# Patient Record
Sex: FEMALE | Race: WHITE | NOT HISPANIC OR LATINO | Employment: OTHER | URBAN - METROPOLITAN AREA
[De-identification: names, ages, dates, MRNs, and addresses within clinical notes are randomized per-mention and may not be internally consistent; named-entity substitution may affect disease eponyms.]

---

## 2017-06-09 ENCOUNTER — HOSPITAL ENCOUNTER (OUTPATIENT)
Dept: RADIOLOGY | Facility: HOSPITAL | Age: 72
Discharge: HOME | End: 2017-06-09
Attending: ORTHOPAEDIC SURGERY
Payer: MEDICARE

## 2017-06-09 ENCOUNTER — OFFICE VISIT (OUTPATIENT)
Dept: ORTHOPEDICS | Facility: CLINIC | Age: 72
End: 2017-06-09
Payer: MEDICARE

## 2017-06-09 VITALS — WEIGHT: 285 LBS | HEIGHT: 68 IN | BODY MASS INDEX: 43.19 KG/M2

## 2017-06-09 DIAGNOSIS — M79.671 RIGHT FOOT PAIN: ICD-10-CM

## 2017-06-09 DIAGNOSIS — M79.671 RIGHT FOOT PAIN: Primary | ICD-10-CM

## 2017-06-09 DIAGNOSIS — M21.621 BUNIONETTE OF RIGHT FOOT: ICD-10-CM

## 2017-06-09 PROCEDURE — 1159F MED LIST DOCD IN RCRD: CPT | Mod: S$GLB,,, | Performed by: ORTHOPAEDIC SURGERY

## 2017-06-09 PROCEDURE — 99203 OFFICE O/P NEW LOW 30 MIN: CPT | Mod: S$GLB,,, | Performed by: ORTHOPAEDIC SURGERY

## 2017-06-09 PROCEDURE — 1125F AMNT PAIN NOTED PAIN PRSNT: CPT | Mod: S$GLB,,, | Performed by: ORTHOPAEDIC SURGERY

## 2017-06-09 PROCEDURE — 73630 X-RAY EXAM OF FOOT: CPT | Mod: 26,RT,, | Performed by: RADIOLOGY

## 2017-06-09 PROCEDURE — 99999 PR PBB SHADOW E&M-EST. PATIENT-LVL II: CPT | Mod: PBBFAC,,, | Performed by: ORTHOPAEDIC SURGERY

## 2017-06-09 PROCEDURE — 73630 X-RAY EXAM OF FOOT: CPT | Mod: TC,RT

## 2017-06-09 RX ORDER — AMLODIPINE BESYLATE 10 MG/1
10 TABLET ORAL DAILY
COMMUNITY
End: 2023-06-08 | Stop reason: SDUPTHER

## 2017-06-09 RX ORDER — METOPROLOL SUCCINATE 50 MG/1
TABLET, EXTENDED RELEASE ORAL
Refills: 3 | COMMUNITY
Start: 2017-05-23

## 2017-06-09 RX ORDER — HYDROCHLOROTHIAZIDE 25 MG/1
TABLET ORAL
Refills: 3 | COMMUNITY
Start: 2017-04-09 | End: 2023-06-08

## 2017-06-09 RX ORDER — DOXAZOSIN 8 MG/1
TABLET ORAL
Refills: 1 | COMMUNITY
Start: 2017-05-23

## 2017-06-09 RX ORDER — FENOFIBRATE 145 MG/1
145 TABLET, FILM COATED ORAL DAILY
COMMUNITY

## 2017-06-09 RX ORDER — SOLIFENACIN SUCCINATE 10 MG/1
TABLET, FILM COATED ORAL
Refills: 3 | COMMUNITY
Start: 2017-06-01

## 2018-01-06 ENCOUNTER — OFFICE VISIT (OUTPATIENT)
Dept: URGENT CARE | Facility: CLINIC | Age: 73
End: 2018-01-06
Payer: MEDICARE

## 2018-01-06 VITALS
HEIGHT: 68 IN | SYSTOLIC BLOOD PRESSURE: 141 MMHG | WEIGHT: 254 LBS | TEMPERATURE: 99 F | RESPIRATION RATE: 18 BRPM | DIASTOLIC BLOOD PRESSURE: 69 MMHG | BODY MASS INDEX: 38.49 KG/M2 | HEART RATE: 78 BPM | OXYGEN SATURATION: 98 %

## 2018-01-06 DIAGNOSIS — M25.512 ACUTE PAIN OF LEFT SHOULDER: ICD-10-CM

## 2018-01-06 DIAGNOSIS — J06.9 UPPER RESPIRATORY INFECTION WITH COUGH AND CONGESTION: Primary | ICD-10-CM

## 2018-01-06 LAB
CTP QC/QA: YES
FLUAV AG NPH QL: NEGATIVE
FLUBV AG NPH QL: NEGATIVE

## 2018-01-06 PROCEDURE — 87804 INFLUENZA ASSAY W/OPTIC: CPT | Mod: 59,QW,S$GLB, | Performed by: NURSE PRACTITIONER

## 2018-01-06 PROCEDURE — 99203 OFFICE O/P NEW LOW 30 MIN: CPT | Mod: 25,S$GLB,, | Performed by: NURSE PRACTITIONER

## 2018-01-06 PROCEDURE — 96372 THER/PROPH/DIAG INJ SC/IM: CPT | Mod: S$GLB,,, | Performed by: EMERGENCY MEDICINE

## 2018-01-06 RX ORDER — BETAMETHASONE SODIUM PHOSPHATE AND BETAMETHASONE ACETATE 3; 3 MG/ML; MG/ML
6 INJECTION, SUSPENSION INTRA-ARTICULAR; INTRALESIONAL; INTRAMUSCULAR; SOFT TISSUE
Status: COMPLETED | OUTPATIENT
Start: 2018-01-06 | End: 2018-01-06

## 2018-01-06 RX ORDER — FLUTICASONE PROPIONATE 50 MCG
2 SPRAY, SUSPENSION (ML) NASAL DAILY
Qty: 1 BOTTLE | Refills: 0 | Status: SHIPPED | OUTPATIENT
Start: 2018-01-06 | End: 2018-06-27

## 2018-01-06 RX ORDER — AZITHROMYCIN 250 MG/1
TABLET, FILM COATED ORAL
Qty: 6 TABLET | Refills: 0 | Status: SHIPPED | OUTPATIENT
Start: 2018-01-06 | End: 2018-06-27

## 2018-01-06 RX ORDER — BENZONATATE 100 MG/1
200 CAPSULE ORAL 3 TIMES DAILY PRN
Qty: 30 CAPSULE | Refills: 0 | Status: SHIPPED | OUTPATIENT
Start: 2018-01-06 | End: 2018-06-27

## 2018-01-06 RX ORDER — CODEINE PHOSPHATE AND GUAIFENESIN 10; 100 MG/5ML; MG/5ML
5 SOLUTION ORAL NIGHTLY PRN
Qty: 90 ML | Refills: 0 | Status: SHIPPED | OUTPATIENT
Start: 2018-01-06 | End: 2018-01-16

## 2018-01-06 RX ADMIN — BETAMETHASONE SODIUM PHOSPHATE AND BETAMETHASONE ACETATE 6 MG: 3; 3 INJECTION, SUSPENSION INTRA-ARTICULAR; INTRALESIONAL; INTRAMUSCULAR; SOFT TISSUE at 10:01

## 2018-01-06 NOTE — PATIENT INSTRUCTIONS
Follow up with your doctor in a few days as needed.  Return to the urgent care or go to the ER if symptoms get worse.    Take Coricidin HBP for decongestant instead of OTC cough/cold medicine due to your high blood pressure.         Please drink plenty of fluids.  Please get plenty of rest.  Please return here or go to the Emergency Department for any concerns or worsening of condition.  If you were prescribed antibiotics, please take them to completion.  If you were given wait & see antibiotics, please wait 5-7 days before taking them, and only take them if your symptoms have worsened or not improved.  If you do begin taking the antibiotics, please take them to completion.  If you were prescribed a narcotic medication, do not drive or operate heavy equipment or machinery while taking these medications.  If you were given a steroid shot in the clinic and have also been given a prescription for a steroid such as Prednisone or a Medrol Dose Pack, please begin taking them tomorrow.  If you do not have Hypertension or any history of palpitations, it is ok to take over the counter Sudafed or Mucinex D or Allegra-D or Claritin-D or Zyrtec-D.  If you do take one of the above, it is ok to combine that with plain over the counter Mucinex or Allegra or Claritin or Zyrtec.  If for example you are taking Zyrtec -D, you can combine that with Mucinex, but not Mucinex-D.  If you are taking Mucinex-D, you can combine that with plain Allegra or Claritin or Zyrtec.   If you do have Hypertension or palpitations, it is safe to take Coricidin HBP for relief of sinus symptoms.  If not allergic, please take over the counter Tylenol (Acetaminophen) and/or Motrin (Ibuprofen) as directed for control of pain and/or fever.  Please follow up with your primary care doctor or specialist as needed.    If you  smoke, please stop smoking.        Viral Upper Respiratory Illness (Adult)  You have a viral upper respiratory illness (URI), which is  another term for the common cold. This illness is contagious during the first few days. It is spread through the air by coughing and sneezing. It may also be spread by direct contact (touching the sick person and then touching your own eyes, nose, or mouth). Frequent handwashing will decrease risk of spread. Most viral illnesses go away within 7 to 10 days with rest and simple home remedies. Sometimes the illness may last for several weeks. Antibiotics will not kill a virus, and they are generally not prescribed for this condition.    Home care  · If symptoms are severe, rest at home for the first 2 to 3 days. When you resume activity, don't let yourself get too tired.  · Avoid being exposed to cigarette smoke (yours or others).  · You may use acetaminophen or ibuprofen to control pain and fever, unless another medicine was prescribed. (Note: If you have chronic liver or kidney disease, have ever had a stomach ulcer or gastrointestinal bleeding, or are taking blood-thinning medicines, talk with your healthcare provider before using these medicines.) Aspirin should never be given to anyone under 18 years of age who is ill with a viral infection or fever. It may cause severe liver or brain damage.  · Your appetite may be poor, so a light diet is fine. Avoid dehydration by drinking 6 to 8 glasses of fluids per day (water, soft drinks, juices, tea, or soup). Extra fluids will help loosen secretions in the nose and lungs.  · Over-the-counter cold medicines will not shorten the length of time youre sick, but they may be helpful for the following symptoms: cough, sore throat, and nasal and sinus congestion. (Note: Do not use decongestants if you have high blood pressure.)  Follow-up care  Follow up with your healthcare provider, or as advised.  When to seek medical advice  Call your healthcare provider right away if any of these occur:  · Cough with lots of colored sputum (mucus)  · Severe headache; face, neck, or ear  pain  · Difficulty swallowing due to throat pain  · Fever of 100.4°F (38°C)  Call 911, or get immediate medical care  Call emergency services right away if any of these occur:  · Chest pain, shortness of breath, wheezing, or difficulty breathing  · Coughing up blood  · Inability to swallow due to throat pain  Date Last Reviewed: 9/13/2015 © 2000-2017 [employer]. [address] [place] [address], [place] [address]. All rights reserved. This information is not intended as a substitute for professional medical care. Always follow your healthcare professional's instructions.        Viral Upper Respiratory Illness (Adult)  You have a viral upper respiratory illness (URI), which is another term for the common cold. This illness is contagious during the first few days. It is spread through the air by coughing and sneezing. It may also be spread by direct contact (touching the sick person and then touching your own eyes, nose, or mouth). Frequent handwashing will decrease risk of spread. Most viral illnesses go away within 7 to 10 days with rest and simple home remedies. Sometimes the illness may last for several weeks. Antibiotics will not kill a virus, and they are generally not prescribed for this condition.    Home care  · If symptoms are severe, rest at home for the first 2 to 3 days. When you resume activity, don't let yourself get too tired.  · Avoid being exposed to cigarette smoke (yours or others).  · You may use acetaminophen or ibuprofen to control pain and fever, unless another medicine was prescribed. (Note: If you have chronic liver or kidney disease, have ever had a stomach ulcer or gastrointestinal bleeding, or are taking blood-thinning medicines, talk with your healthcare provider before using these medicines.) Aspirin should never be given to anyone under 18 years of age who is ill with a viral infection or fever. It may cause severe liver or brain damage.  · Your appetite may be poor, so a light  diet is fine. Avoid dehydration by drinking 6 to 8 glasses of fluids per day (water, soft drinks, juices, tea, or soup). Extra fluids will help loosen secretions in the nose and lungs.  · Over-the-counter cold medicines will not shorten the length of time youre sick, but they may be helpful for the following symptoms: cough, sore throat, and nasal and sinus congestion. (Note: Do not use decongestants if you have high blood pressure.)  Follow-up care  Follow up with your healthcare provider, or as advised.  When to seek medical advice  Call your healthcare provider right away if any of these occur:  · Cough with lots of colored sputum (mucus)  · Severe headache; face, neck, or ear pain  · Difficulty swallowing due to throat pain  · Fever of 100.4°F (38°C)  Call 911, or get immediate medical care  Call emergency services right away if any of these occur:  · Chest pain, shortness of breath, wheezing, or difficulty breathing  · Coughing up blood  · Inability to swallow due to throat pain  Date Last Reviewed: 9/13/2015 © 2000-2017 [employer]. [address] [place] [id number]. All rights reserved. This information is not intended as a substitute for professional medical care. Always follow your healthcare professional's instructions.

## 2018-06-27 ENCOUNTER — HOSPITAL ENCOUNTER (EMERGENCY)
Facility: HOSPITAL | Age: 73
Discharge: HOME | End: 2018-06-27
Attending: EMERGENCY MEDICINE
Payer: MEDICARE

## 2018-06-27 VITALS
TEMPERATURE: 99 F | HEIGHT: 68 IN | WEIGHT: 260 LBS | HEART RATE: 58 BPM | SYSTOLIC BLOOD PRESSURE: 148 MMHG | RESPIRATION RATE: 18 BRPM | OXYGEN SATURATION: 96 % | DIASTOLIC BLOOD PRESSURE: 68 MMHG | BODY MASS INDEX: 39.4 KG/M2

## 2018-06-27 DIAGNOSIS — M25.572 LEFT LATERAL ANKLE PAIN: ICD-10-CM

## 2018-06-27 DIAGNOSIS — S32.001A CLOSED BURST FRACTURE OF LUMBAR VERTEBRA, INITIAL ENCOUNTER: Primary | ICD-10-CM

## 2018-06-27 DIAGNOSIS — R07.9 CHEST PAIN: ICD-10-CM

## 2018-06-27 DIAGNOSIS — R07.81 RIB PAIN ON RIGHT SIDE: ICD-10-CM

## 2018-06-27 DIAGNOSIS — M25.559 HIP PAIN: ICD-10-CM

## 2018-06-27 DIAGNOSIS — M79.672 LEFT FOOT PAIN: ICD-10-CM

## 2018-06-27 PROCEDURE — 63600175 PHARM REV CODE 636 W HCPCS: Performed by: EMERGENCY MEDICINE

## 2018-06-27 PROCEDURE — 99284 EMERGENCY DEPT VISIT MOD MDM: CPT | Mod: 25

## 2018-06-27 PROCEDURE — 96372 THER/PROPH/DIAG INJ SC/IM: CPT

## 2018-06-27 PROCEDURE — 25000003 PHARM REV CODE 250: Performed by: EMERGENCY MEDICINE

## 2018-06-27 RX ORDER — KETOROLAC TROMETHAMINE 30 MG/ML
15 INJECTION, SOLUTION INTRAMUSCULAR; INTRAVENOUS
Status: COMPLETED | OUTPATIENT
Start: 2018-06-27 | End: 2018-06-27

## 2018-06-27 RX ORDER — OXYCODONE AND ACETAMINOPHEN 5; 325 MG/1; MG/1
1 TABLET ORAL
Status: COMPLETED | OUTPATIENT
Start: 2018-06-27 | End: 2018-06-27

## 2018-06-27 RX ORDER — ALLOPURINOL 100 MG/1
100 TABLET ORAL DAILY
COMMUNITY
End: 2023-06-08

## 2018-06-27 RX ORDER — METHOCARBAMOL 100 MG/ML
1000 INJECTION, SOLUTION INTRAMUSCULAR; INTRAVENOUS ONCE
Status: COMPLETED | OUTPATIENT
Start: 2018-06-27 | End: 2018-06-27

## 2018-06-27 RX ORDER — HYDROCODONE BITARTRATE AND ACETAMINOPHEN 5; 325 MG/1; MG/1
1 TABLET ORAL 2 TIMES DAILY PRN
COMMUNITY
End: 2018-09-07

## 2018-06-27 RX ADMIN — METHOCARBAMOL 600 MG: 100 INJECTION, SOLUTION INTRAMUSCULAR; INTRAVENOUS at 06:06

## 2018-06-27 RX ADMIN — OXYCODONE HYDROCHLORIDE AND ACETAMINOPHEN 1 TABLET: 5; 325 TABLET ORAL at 05:06

## 2018-06-27 RX ADMIN — KETOROLAC TROMETHAMINE 15 MG: 30 INJECTION, SOLUTION INTRAMUSCULAR at 05:06

## 2018-06-27 NOTE — ED PROVIDER NOTES
Encounter Date: 2018    SCRIBE #1 NOTE: I, Alisia Boyle, am scribing for, and in the presence of,  Dr. Bravo. I have scribed the entire note.       History     Chief Complaint   Patient presents with    Fall     pt tripped and hit back on a truck and then fell and landed on her bottom, denies LOC, denies blood thinners, endorses back, rib, buttock and L toe pain from fall.      This is a 72 y.o. female who has a past medical history of Disorder of kidney and ureter (); High cholesterol; Hypertension; and Osteoarthritis.     The patient presents to the Emergency Department with back pain s/p fall today.   Pt states she was standing with her hand on a board when her son moved the board and she stepped back, tripping over a cinderblock. She fell to the ground in a sitting position, hitting her head and shoulders on the truck.  Symptoms are associated with right flank pain, left foot bruising, and ankle pain.  Pt denies syncope, headache, neck pain, tingling, numbness, weakness, or nausea/vomiting.   Symptoms are aggravated by movement and certain positions.  She reports no alleviating factors. Pt took Vicadin 4 hours ago.  Patient states she needs bilateral knee replacements and uses a walker normally for ambulation. She notes that she forgot to take her bladder Rx today. She reports a history of degenerative disc disease.    Pt has a past surgical history that includes  section; Cholecystectomy (1974); Appendectomy (1975); Tubal ligation (1978); Incisional hernia repair (1996); Elbow surgery (Right, 2001); and Endometrial ablation (2002).        The history is provided by the patient.     Review of patient's allergies indicates:   Allergen Reactions    Ditropan [oxybutynin chloride]      DIZZINESS    Lisinopril Rash    Lotensin [benazepril] Rash     Past Medical History:   Diagnosis Date    Disorder of kidney and ureter 2016    High cholesterol     Hypertension      Osteoarthritis      Past Surgical History:   Procedure Laterality Date    APPENDECTOMY  1975     SECTION      x 3-, ,     CHOLECYSTECTOMY  1974    ELBOW SURGERY Right 2001    removal of lipoma    ENDOMETRIAL ABLATION  2002    INCISIONAL HERNIA REPAIR  1996    TUBAL LIGATION  1978     No family history on file.  Social History   Substance Use Topics    Smoking status: Never Smoker    Smokeless tobacco: Never Used    Alcohol use Yes      Comment: maybe once a yr if that     Review of Systems   Constitutional: Negative for chills and fever.   HENT: Negative for congestion, rhinorrhea and sore throat.    Eyes: Negative for redness and visual disturbance.   Respiratory: Negative for cough, shortness of breath and wheezing.    Cardiovascular: Negative for chest pain and palpitations.   Gastrointestinal: Negative for abdominal pain, diarrhea, nausea and vomiting.   Genitourinary: Positive for flank pain (right side). Negative for dysuria and hematuria.   Musculoskeletal: Positive for arthralgias, back pain and myalgias. Negative for neck pain.   Skin: Positive for color change (Left foot bruising.). Negative for rash.   Neurological: Negative for dizziness, weakness, light-headedness, numbness and headaches.        Negative for tingling.   Psychiatric/Behavioral: Negative for confusion.   All other systems reviewed and are negative.      Physical Exam     Initial Vitals [18 1521]   BP Pulse Resp Temp SpO2   (!) 146/68 83 18 98.1 °F (36.7 °C) 95 %      MAP       --         Physical Exam    Nursing note and vitals reviewed.  Constitutional: She appears well-developed and well-nourished. She is not diaphoretic. No distress.   HENT:   Head: Normocephalic and atraumatic.   Mouth/Throat: Oropharynx is clear and moist.   Eyes: Conjunctivae and EOM are normal. Pupils are equal, round, and reactive to light. No scleral icterus.   Neck: Normal range of motion. Neck supple.    Cardiovascular: Normal rate, regular rhythm and normal heart sounds. Exam reveals no gallop and no friction rub.    No murmur heard.      Pulmonary/Chest: Breath sounds normal. She has no wheezes. She has no rhonchi. She has no rales.   Tenderness to right lateral chest wall.   Abdominal: Soft. Bowel sounds are normal. There is no tenderness.   Musculoskeletal:        Left ankle: She exhibits swelling. She exhibits normal range of motion and no ecchymosis. Tenderness. Lateral malleolus tenderness found.        Cervical back: She exhibits no tenderness and no bony tenderness.        Thoracic back: She exhibits tenderness and bony tenderness.        Lumbar back: She exhibits tenderness and bony tenderness.        Back:    Tenderness to the mid/low T-spine and L-spine.   No bruising/swelling.    Neurological: She is alert and oriented to person, place, and time.   Skin: Skin is warm and dry. No rash noted. No erythema.   Psychiatric: She has a normal mood and affect. Her behavior is normal.         ED Course   Procedures  Labs Reviewed - No data to display       Imaging Results          CT Lumbar Spine Without Contrast (Final result)  Result time 06/27/18 18:22:34    Final result by Randy Singh MD (06/27/18 18:22:34)                 Impression:      No evidence of fracture of the T9 vertebral body as suggested on the preceding x-ray series.  Schmorl's node involving the superior endplate of T8.    Acute compression fracture of the L1 vertebral body with involvement of the posterior cortex.  No evidence of retropulsion into the spinal canal.  The findings are consistent of a burst type fracture.  Approximately 30% loss of the vertebral body height.    Advanced multilevel degenerative changes of the lumbar spine.    Advanced atherosclerotic disease of the abdominal aorta and its branch vessels.    Retroaortic left renal vein.      Electronically signed by: Randy Singh MD  Date:    06/27/2018  Time:    18:22              Narrative:    EXAMINATION:  CT THORACIC SPINE WITHOUT CONTRAST; CT LUMBAR SPINE WITHOUT CONTRAST    CLINICAL HISTORY:  back pain after fall, T9 fracture;; back pain after fall, L1 fracture;    TECHNIQUE:  Axial CT scan of the thoracolumbar spine was performed without intravenous contrast.  Coronal and sagittal reformats were obtained.    COMPARISON:  X-rays of the spine dated 06/27/2018.    FINDINGS:  CT scan of the thoracic spine:    There is a prominence of the normal thoracic kyphosis.  The remainder of the thoracic alignment is within normal limits.  There is a Schmorl's node involving the superior endplate of T8.  The remainder of the vertebral body heights are maintained.  No compression fracture of the T9 vertebral body is identified.  There is overall diffuse osteopenia.  There is no evidence of mass effect in the spinal canal.  There is hypertrophy of the posterior elements.  There is multilevel disc desiccation.  There is variable spinal canal and neural foraminal narrowing.  There is no evidence of acute fracture or listhesis of the thoracic spine.    The visualized lung fields are unremarkable.  There is no evidence of a pneumothorax.  No pulmonary contusion is identified.  There are extensive calcifications involving the thoracic and abdominal aorta.  There is a retroaortic left renal vein.  There is no evidence of lymphadenopathy.    CT lumbar spine:    There is straightening of the normal lumbar lordosis.  There is an acute compression fracture of the L1 vertebral body with approximately 30% loss of the vertebral body height.  There is involvement of the posterior cortex.  There is no evidence of retropulsion into the spinal canal.  The remainder of the vertebral body heights are maintained.  There is marked hypertrophy of the posterior elements.  The transverse processes are within normal limits.  There is advanced disc desiccation at multiple levels.  There is variable spinal canal and neural  foraminal narrowing.  There is severe neural foraminal narrowing at the L4-L5 level.    There are extensive calcifications within the abdominal aorta and its branch vessels.  There is a retroaortic left renal vein.  There is no evidence of a hematoma in the pelvis.                               CT Thoracic Spine Without Contrast (Final result)  Result time 06/27/18 18:22:34    Final result by Randy Singh MD (06/27/18 18:22:34)                 Impression:      No evidence of fracture of the T9 vertebral body as suggested on the preceding x-ray series.  Schmorl's node involving the superior endplate of T8.    Acute compression fracture of the L1 vertebral body with involvement of the posterior cortex.  No evidence of retropulsion into the spinal canal.  The findings are consistent of a burst type fracture.  Approximately 30% loss of the vertebral body height.    Advanced multilevel degenerative changes of the lumbar spine.    Advanced atherosclerotic disease of the abdominal aorta and its branch vessels.    Retroaortic left renal vein.      Electronically signed by: Randy Singh MD  Date:    06/27/2018  Time:    18:22             Narrative:    EXAMINATION:  CT THORACIC SPINE WITHOUT CONTRAST; CT LUMBAR SPINE WITHOUT CONTRAST    CLINICAL HISTORY:  back pain after fall, T9 fracture;; back pain after fall, L1 fracture;    TECHNIQUE:  Axial CT scan of the thoracolumbar spine was performed without intravenous contrast.  Coronal and sagittal reformats were obtained.    COMPARISON:  X-rays of the spine dated 06/27/2018.    FINDINGS:  CT scan of the thoracic spine:    There is a prominence of the normal thoracic kyphosis.  The remainder of the thoracic alignment is within normal limits.  There is a Schmorl's node involving the superior endplate of T8.  The remainder of the vertebral body heights are maintained.  No compression fracture of the T9 vertebral body is identified.  There is overall diffuse osteopenia.  There is no  evidence of mass effect in the spinal canal.  There is hypertrophy of the posterior elements.  There is multilevel disc desiccation.  There is variable spinal canal and neural foraminal narrowing.  There is no evidence of acute fracture or listhesis of the thoracic spine.    The visualized lung fields are unremarkable.  There is no evidence of a pneumothorax.  No pulmonary contusion is identified.  There are extensive calcifications involving the thoracic and abdominal aorta.  There is a retroaortic left renal vein.  There is no evidence of lymphadenopathy.    CT lumbar spine:    There is straightening of the normal lumbar lordosis.  There is an acute compression fracture of the L1 vertebral body with approximately 30% loss of the vertebral body height.  There is involvement of the posterior cortex.  There is no evidence of retropulsion into the spinal canal.  The remainder of the vertebral body heights are maintained.  There is marked hypertrophy of the posterior elements.  The transverse processes are within normal limits.  There is advanced disc desiccation at multiple levels.  There is variable spinal canal and neural foraminal narrowing.  There is severe neural foraminal narrowing at the L4-L5 level.    There are extensive calcifications within the abdominal aorta and its branch vessels.  There is a retroaortic left renal vein.  There is no evidence of a hematoma in the pelvis.                               X-Ray Ankle Complete Left (Final result)  Result time 06/27/18 17:05:05    Final result by Eliecer Ventura MD (06/27/18 17:05:05)                 Impression:      As above      Electronically signed by: Eliecer Ventura MD  Date:    06/27/2018  Time:    17:05             Narrative:    EXAMINATION:  XR ANKLE COMPLETE 3 VIEW LEFT    CLINICAL HISTORY:  Pain in left ankle and joints of left foot    TECHNIQUE:  AP, lateral and oblique views of the left ankle were  performed.    COMPARISON:  None    FINDINGS:  Three views.    There is diffuse edema about the ankle.  There is osteopenia.  Ankle mortise is intact.  No convincing acute displaced fracture or dislocation of the ankle.  No radiopaque foreign body.                               X-Ray Foot Complete Left (Final result)  Result time 06/27/18 17:09:37    Final result by Miley Benjamin MD (06/27/18 17:09:37)                 Impression:      No definite acute process seen.      Electronically signed by: Miley Benjamin MD  Date:    06/27/2018  Time:    17:09             Narrative:    EXAMINATION:  XR FOOT COMPLETE 3 VIEW LEFT    CLINICAL HISTORY:  .  Pain in left foot    TECHNIQUE:  AP, lateral and oblique views of the left foot were performed.    COMPARISON:  None    FINDINGS:  Decreased mineralization of the osseous structures.  The alignment is normal. No definite acute fracture identified. No osseous lesions. Small inferior calcaneal spur.  The soft tissues appear normal.                               X-Ray Hips Bilateral 2 View Incl AP Pelvis (Final result)  Result time 06/27/18 17:08:01    Final result by Miley Benjamin MD (06/27/18 17:08:01)                 Impression:      No definite acute fractures seen.    If patient is significantly symptomatic and cannot bear weight a CT can be performed.      Electronically signed by: Miley Benjamin MD  Date:    06/27/2018  Time:    17:08             Narrative:    EXAMINATION:  XR HIPS BILATERAL 2 VIEW INCL AP PELVIS    CLINICAL HISTORY:  Pain in unspecified hip    TECHNIQUE:  AP view of the pelvis and frogleg lateral views of both hips were performed.    COMPARISON:  None.    FINDINGS:  Mild bilateral hip joint space narrowing.  No significant sclerosis or osteophyte formation.  No large geodes.  The bilateral femoral heads demonstrate normal contour.  The bilateral sacroiliac joints appear symmetrical.  The symphysis pubis appear normal.  No fracture  identified, no osseous lesions.  The soft tissues appear normal.                               X-Ray Thoracic Spine AP Lateral (Final result)  Result time 06/27/18 17:07:13    Final result by Randy Singh MD (06/27/18 17:07:13)                 Impression:      Minimal anterior wedge compression deformity of the T9 vertebral body.  This remains overall indeterminate.  Noncontrast MRI of the thoracic spine may be obtained for further evaluation.    Otherwise, unremarkable examination.      Electronically signed by: Randy Singh MD  Date:    06/27/2018  Time:    17:07             Narrative:    EXAMINATION:  XR THORACIC SPINE AP LATERAL    CLINICAL HISTORY:  back pain after fall from standing;    TECHNIQUE:  Frontal and lateral radiographs of the thoracic spine.    COMPARISON:  None    FINDINGS:  The thoracic alignment is within normal limits.  There is diffuse osteopenia.  There is minimal anterior wedge deformity of the T9 vertebral body.  This remains indeterminate.  The remaining vertebral body heights are maintained.  There is hypertrophy of the posterior elements.  There is intervertebral disc space narrowing at multiple levels.  There is variable spinal canal and neural foraminal narrowing.    There are calcifications of the aortic knob.  There are nonspecific calcifications in the left upper abdominal quadrant.  The paraspinal soft tissues are otherwise unremarkable.                               X-Ray Lumbar Spine Ap And Lateral (Final result)  Result time 06/27/18 17:05:23    Final result by Miley Benjamin MD (06/27/18 17:05:23)                 Impression:      Significant spondylosis of the lumbar spine.    Age-indeterminate moderate fracture of L1.      Electronically signed by: Miley Benjamin MD  Date:    06/27/2018  Time:    17:05             Narrative:    EXAMINATION:  XR LUMBAR SPINE AP AND LATERAL    CLINICAL HISTORY:  back pain after fall from standing;    TECHNIQUE:  AP, lateral and spot  images were performed of the lumbar spine.    COMPARISON:  None    FINDINGS:  The alignment of the lumbar spine appears normal.  There is moderate loss of height of the L1 vertebrae suggestive of an age indeterminate fracture.  There is moderate is severe disc space narrowing noted at all levels.  There is no osseous lesions identified.  The sacroiliac joints appear symmetrical on the AP view.  Extensive atherosclerotic changes of the aorta and iliac vessels.                               X-Ray Ribs 2 View Right (Final result)  Result time 06/27/18 17:10:11    Final result by Randy Singh MD (06/27/18 17:10:11)                 Impression:      No acute process.      Electronically signed by: Randy Singh MD  Date:    06/27/2018  Time:    17:10             Narrative:    EXAMINATION:  XR RIBS 2 VIEW RIGHT    CLINICAL HISTORY:  Pleurodynia    TECHNIQUE:  Two views of the right ribs were performed.    COMPARISON:  None    FINDINGS:  There is diffuse demineralization of the osseous structures.  There is no evidence of a displaced right-sided rib fracture.  There is arthropathy involving the visualized portions of the right shoulder.  There is also arthropathy involving the acromioclavicular joint.  No evidence of a pneumothorax is present.  There is no evidence of pulmonary contusion.                               X-Ray Chest PA And Lateral (Final result)  Result time 06/27/18 17:06:51    Final result by Miley Benjamin MD (06/27/18 17:06:51)                 Impression:      No acute intrathoracic abnormality seen.    Mild loss of height of the T8 and probably L1 vertebrae  of unknown chronicity      Electronically signed by: Miley Benjamin MD  Date:    06/27/2018  Time:    17:06             Narrative:    EXAMINATION:  XR CHEST PA AND LATERAL    CLINICAL HISTORY:  Chest pain, unspecified    TECHNIQUE:  PA and lateral views of the chest were performed.    COMPARISON:  None    FINDINGS:  The lungs are clear,  with normal appearance of pulmonary vasculature and no pleural effusion or pneumothorax.    The cardiac silhouette is normal in size. The hilar and mediastinal contours are unremarkable.    Bones are intact. Mild loss of height of a upper lumbar vertebrae.  Mild loss of height of the T8 vertebrae.                                 Medical Decision Making:   Initial Assessment:   71 y/o female presents to the ED s/p fall from standing with back, right flank, and L ankle pain.   Will obtain X-rays and reassess.  Differential Diagnosis:   Differential Diagnosis includes, but is not limited to:  Fracture, dislocation, compartment syndrome, nerve injury/palsy, vascular injury, rhabdomyolysis, hemarthrosis, septic joint, bursitis, muscle strain, ligament tear/sprain, laceration with foreign body, abrasion, soft tissue contusion, osteoarthritis.    Clinical Tests:   Radiological Study: Ordered and Reviewed  ED Management:  X-rays suggestive of T9 and L1 vertebral body fractures.  Will obtain CT T/L spine.    CT revealed no T-spine fx, L1 burst fracture with 30% height loss, no retropulsion.  Discussed with transfer center and Dr. Reina, KATYA, who recommends TLSO brace for comfort and f/u in [facility] as OP.  Patient and family updated on findings and comfortable with plan.   Upon re-evaluation, the patient's status has remained stable.  After complete ED evaluation, clinical impression is most consistent with lumbar compression fx.  At this time, I feel there is no emergent condition requiring further evaluation or admission. I believe the patient is stable for discharge from the ED. The patient and any additional family present were updated with test results, overall clinical impression, and recommended further plan of care. All questions were answered. The patient expressed understanding and agreed with current plan for discharge with Ortho Spine follow-up within 1 week. Strict return precautions were provided,  including weakness, neuro deficits, incontinence, return/worsening of current symptoms or any other concerns.                         Clinical Impression:     1. Closed burst fracture of lumbar vertebra, initial encounter    2. Chest pain    3. Rib pain on right side    4. Hip pain    5. Left foot pain    6. Left lateral ankle pain            Disposition:   Disposition: Discharged  Condition: Stable      I, Dr. Alex Bravo, personally performed the services described in this documentation. All medical record entries made by the scribe were at my direction and in my presence.  I have reviewed the chart and agree that the record reflects my personal performance and is accurate and complete.     Alex Bravo MD.  4:01 PM 06/27/2018                       Alex Bravo MD  06/27/18 [id number]

## 2018-06-27 NOTE — ED TRIAGE NOTES
Fall (pt tripped and hit back on a truck and then fell and landed on her bottom, denies LOC, denies blood thinners, endorses back, rib, buttock and L toe pain from fall. )

## 2018-06-28 NOTE — ED NOTES
Pt with no c/o pain 3/10 to back at this time.  States that she is ready to go.  Dr. Bravo at bedside to discuss discharge instructions.

## 2018-06-28 NOTE — DISCHARGE INSTRUCTIONS
[facility]  Address: [address] [place] [address] [place] [address]   Phone: [phone]    Ask for TLSO brace for comfort.

## 2018-06-28 NOTE — ED NOTES
Discharge instructions and prescriptions given and explained.  Follow up care and back brace purchase discussed with pt and daughter.

## 2018-07-13 ENCOUNTER — OFFICE VISIT (OUTPATIENT)
Dept: ORTHOPEDICS | Facility: CLINIC | Age: 73
End: 2018-07-13
Payer: MEDICARE

## 2018-07-13 VITALS — HEIGHT: 68 IN | BODY MASS INDEX: 39.4 KG/M2 | WEIGHT: 259.94 LBS

## 2018-07-13 DIAGNOSIS — S32.010A CLOSED COMPRESSION FRACTURE OF FIRST LUMBAR VERTEBRA, INITIAL ENCOUNTER: Primary | ICD-10-CM

## 2018-07-13 PROCEDURE — 99214 OFFICE O/P EST MOD 30 MIN: CPT | Mod: S$GLB,,, | Performed by: PHYSICIAN ASSISTANT

## 2018-07-13 PROCEDURE — 99999 PR PBB SHADOW E&M-EST. PATIENT-LVL III: CPT | Mod: PBBFAC,,, | Performed by: PHYSICIAN ASSISTANT

## 2018-07-13 RX ORDER — CALCITONIN SALMON 200 [IU]/.09ML
SPRAY, METERED NASAL
Qty: 22.2 ML | Refills: 0 | Status: SHIPPED | OUTPATIENT
Start: 2018-07-13 | End: 2018-09-28

## 2018-07-13 RX ORDER — TRAMADOL HYDROCHLORIDE 50 MG/1
50 TABLET ORAL EVERY 4 HOURS PRN
Qty: 60 TABLET | Refills: 0 | Status: SHIPPED | OUTPATIENT
Start: 2018-07-13 | End: 2018-12-14 | Stop reason: SDUPTHER

## 2018-07-13 RX ORDER — ACETAMINOPHEN AND CODEINE PHOSPHATE 300; 30 MG/1; MG/1
TABLET ORAL
COMMUNITY
Start: 2016-09-19 | End: 2018-07-13

## 2018-07-13 RX ORDER — CALCITONIN SALMON 200 [IU]/.09ML
1 SPRAY, METERED NASAL DAILY
Qty: 1 BOTTLE | Refills: 0 | Status: SHIPPED | OUTPATIENT
Start: 2018-07-13 | End: 2018-07-13 | Stop reason: SDUPTHER

## 2018-07-13 RX ORDER — CYCLOBENZAPRINE HCL 10 MG
TABLET ORAL
Refills: 3 | COMMUNITY
Start: 2018-07-05

## 2018-07-13 NOTE — PROGRESS NOTES
DATE: 2018  PATIENT: Lexii Gonzalez    Supervising Physician: Wu Sal M.D.    CHIEF COMPLAINT: back pain    HISTORY:  Lexii Gonzalez is a 72 y.o. female here for initial evaluation of low back pain (Back - 8). The pain has been present for 2 weeks.  She slipped and fell landing on her back on .  She presented to the ED after the fall and was diagnosed with an L1 compression fracture.  She says the pain in her back has improved only slightly since she fell. The patient describes the pain as aching and sharp.  The pain is worse with rolling over in bed and getting up from a sitting position and improved by nothing in particular. There is no associated numbness and tingling. There is no subjective weakness. Prior treatments have included hydrocodone, tylenol #3 and flexeril, but no calcitonin, ESIs or surgery.  She uses a Rolator to ambulate.  She was fitted with a TLSO yesterday.     The patient denies myelopathic symptoms such as handwriting changes or difficulty with buttons/coins/keys. Denies perineal paresthesias, bowel/bladder dysfunction.    PAST MEDICAL/SURGICAL HISTORY:  Past Medical History:   Diagnosis Date    Disorder of kidney and ureter 2016    High cholesterol     Hypertension     Osteoarthritis      Past Surgical History:   Procedure Laterality Date    APPENDECTOMY  1975     SECTION      x 3-, ,     CHOLECYSTECTOMY  1974    ELBOW SURGERY Right 2001    removal of lipoma    ENDOMETRIAL ABLATION  2002    INCISIONAL HERNIA REPAIR  1996    TUBAL LIGATION  1978       Current Medications:   Current Outpatient Prescriptions:     allopurinol (ZYLOPRIM) 100 MG tablet, Take 100 mg by mouth once daily., Disp: , Rfl:     amlodipine (NORVASC) 10 MG tablet, Take 10 mg by mouth once daily., Disp: , Rfl:     cyclobenzaprine (FLEXERIL) 10 MG tablet, TK 1 T PO TID PRN, Disp: , Rfl: 3    doxazosin (CARDURA) 8 MG Tab, TK 2 TS PO QD FOR BP, Disp: , Rfl:  1    fenofibrate (TRICOR) 145 MG tablet, Take 145 mg by mouth once daily., Disp: , Rfl:     hydrochlorothiazide (HYDRODIURIL) 25 MG tablet, TK 1 T PO QD UTD FOR BP AND SWELLING, Disp: , Rfl: 3    metoprolol succinate (TOPROL-XL) 50 MG 24 hr tablet, TK 1.5 T PO D, Disp: , Rfl: 3    VESICARE 10 mg tablet, TK 1 T PO D, Disp: , Rfl: 3    calcitonin, salmon, (FORTICAL) 200 unit/actuation nasal spray, 1 spray by Nasal route once daily., Disp: 1 Bottle, Rfl: 0    HYDROcodone-acetaminophen (NORCO) 5-325 mg per tablet, Take 1 tablet by mouth 2 (two) times daily as needed for Pain., Disp: , Rfl:     traMADol (ULTRAM) 50 mg tablet, Take 1 tablet (50 mg total) by mouth every 4 (four) hours as needed for Pain., Disp: 60 tablet, Rfl: 0    Social History:   Social History     Social History    Marital status:      Spouse name: N/A    Number of children: N/A    Years of education: N/A     Occupational History    Not on file.     Social History Main Topics    Smoking status: Never Smoker    Smokeless tobacco: Never Used    Alcohol use Yes      Comment: maybe once a yr if that    Drug use: No    Sexual activity: Not on file     Other Topics Concern    Not on file     Social History Narrative    No narrative on file       REVIEW OF SYSTEMS:  Constitution: Negative. Negative for chills, fever and night sweats.   Cardiovascular: Negative for chest pain and syncope.   Respiratory: Negative for cough and shortness of breath.   Gastrointestinal: See HPI. Negative for nausea/vomiting. Negative for abdominal pain.  Genitourinary: See HPI. Negative for discoloration or dysuria.  Skin: Negative for dry skin, itching and rash.   Hematologic/Lymphatic: Negative for bleeding problem. Does not bruise/bleed easily.   Musculoskeletal: Negative for falls and muscle weakness.   Neurological: See HPI. No seizures.   Endocrine: Negative for polydipsia, polyphagia and polyuria.   Allergic/Immunologic: Negative for hives and  "persistent infections.    PHYSICAL EXAMINATION:    Ht 5' 8" (1.727 m)   Wt 117.9 kg (259 lb 14.8 oz)   BMI 39.52 kg/m²     General: The patient is a very pleasant 72 y.o. female in no apparent distress, the patient is oriented to person, place and time.   Psych: Normal mood and affect  HEENT: Vision grossly intact, hearing intact to the spoken word.  Lungs: Respirations unlabored.  Gait: Normal station and gait, no difficulty with toe or heel walk.   Skin: Dorsal lumbar skin negative for rashes, lesions, hairy patches and surgical scars.  Range of motion: Lumbar range of motion is acceptable. There is mild thoracolumbar tenderness to palpation.  Spinal Balance: Global saggital and coronal spinal balance acceptable, no significant for scoliosis and kyphosis.  Musculoskeletal: No pain with the range of motion of the bilateral hips. No trochanteric tenderness to palpation.  Vascular: Bilateral lower extremities warm and well perfused, Dorsalis pedis pulses 2+ bilaterally.  Neurological: Normal strength and tone in all major motor groups in the bilateral lower extremities. Normal sensation to light touch in the L2-S1 dermatomes bilaterally.  Deep tendon reflexes symmetric 1+ in the bilateral lower extremities.  Negative Babinski bilaterally.  Straight leg raise negative bilaterally.     IMAGING:   Today I personally reviewed plain films and CT thoracic and lumbar spine which show an acute L1 compression fracture.        Body mass index is 39.52 kg/m².    No results found for: HGBA1C      ASSESSMENT/PLAN:    Diagnoses and all orders for this visit:    Closed compression fracture of first lumbar vertebra, initial encounter  -     X-Ray Lumbar Spine Ap And Lateral; Future    Other orders  -     calcitonin, salmon, (FORTICAL) 200 unit/actuation nasal spray; 1 spray by Nasal route once daily.  -     traMADol (ULTRAM) 50 mg tablet; Take 1 tablet (50 mg total) by mouth every 4 (four) hours as needed for Pain.    She will " continue TLSO brace.  Prescription for calcitonin nasal spray sent to the pharmacy.  I have also given her a prescription for tramadol.  I will see her back in 8 weeks with repeat imaging, sooner with any new or worsening symptoms.       Follow-up if symptoms worsen or fail to improve.

## 2018-09-07 ENCOUNTER — HOSPITAL ENCOUNTER (OUTPATIENT)
Dept: RADIOLOGY | Facility: HOSPITAL | Age: 73
Discharge: HOME | End: 2018-09-07
Attending: PHYSICIAN ASSISTANT
Payer: MEDICARE

## 2018-09-07 ENCOUNTER — OFFICE VISIT (OUTPATIENT)
Dept: ORTHOPEDICS | Facility: CLINIC | Age: 73
End: 2018-09-07
Payer: MEDICARE

## 2018-09-07 VITALS — BODY MASS INDEX: 39.4 KG/M2 | HEIGHT: 68 IN | WEIGHT: 259.94 LBS

## 2018-09-07 DIAGNOSIS — S32.010A CLOSED COMPRESSION FRACTURE OF FIRST LUMBAR VERTEBRA, INITIAL ENCOUNTER: ICD-10-CM

## 2018-09-07 DIAGNOSIS — S32.010A CLOSED COMPRESSION FRACTURE OF FIRST LUMBAR VERTEBRA, INITIAL ENCOUNTER: Primary | ICD-10-CM

## 2018-09-07 PROCEDURE — 99999 PR PBB SHADOW E&M-EST. PATIENT-LVL III: CPT | Mod: PBBFAC,,, | Performed by: PHYSICIAN ASSISTANT

## 2018-09-07 PROCEDURE — 72100 X-RAY EXAM L-S SPINE 2/3 VWS: CPT | Mod: 26,,, | Performed by: RADIOLOGY

## 2018-09-07 PROCEDURE — 99213 OFFICE O/P EST LOW 20 MIN: CPT | Mod: PBBFAC,25 | Performed by: PHYSICIAN ASSISTANT

## 2018-09-07 PROCEDURE — 99213 OFFICE O/P EST LOW 20 MIN: CPT | Mod: S$PBB,,, | Performed by: PHYSICIAN ASSISTANT

## 2018-09-07 PROCEDURE — 72100 X-RAY EXAM L-S SPINE 2/3 VWS: CPT | Mod: TC

## 2018-09-07 PROCEDURE — 1101F PT FALLS ASSESS-DOCD LE1/YR: CPT | Mod: CPTII,,, | Performed by: PHYSICIAN ASSISTANT

## 2018-09-07 RX ORDER — DIAZEPAM 2 MG/1
2 TABLET ORAL
Qty: 2 TABLET | Refills: 0 | Status: SHIPPED | OUTPATIENT
Start: 2018-09-07 | End: 2018-09-28

## 2018-09-07 RX ORDER — HYDROCODONE BITARTRATE AND ACETAMINOPHEN 7.5; 325 MG/1; MG/1
1 TABLET ORAL EVERY 12 HOURS PRN
Qty: 42 TABLET | Refills: 0 | Status: SHIPPED | OUTPATIENT
Start: 2018-09-07 | End: 2018-12-14

## 2018-09-07 NOTE — PROGRESS NOTES
DATE: 9/7/2018  PATIENT: Lexii Gonzalez    Attending Physician: Wu Sal M.D.    Date of Injury: 6/27/2018    Diagnosis:   1. Closed compression fracture of first lumbar vertebra, initial encounter        History: Lexii Gonzalez is seen today for follow-up following the above listed injury. Overall the patient is doing well but today notes her pain is improved slightly, but she still has a significant amount of pain.  She rates her pain 7/10 today.  She is wearing a TLSO.  She is ambulating with a Rolator.  She is taking norco for pain but she can't sleep at night because of pain.     Exam: Pleasant, NAD. Neuro exam is stable. No signs of DVT.  There is mild midline tenderness to palpation.    Radiographs: imaging shows progression of L1 compression fracture.     Assessment/Plan:   Discussed with Dr. Sal.  Will get a new MRI.  I will call with results.

## 2018-09-14 ENCOUNTER — HOSPITAL ENCOUNTER (OUTPATIENT)
Dept: RADIOLOGY | Facility: HOSPITAL | Age: 73
Discharge: HOME | End: 2018-09-14
Attending: PHYSICIAN ASSISTANT
Payer: MEDICARE

## 2018-09-14 DIAGNOSIS — S32.010A CLOSED COMPRESSION FRACTURE OF FIRST LUMBAR VERTEBRA, INITIAL ENCOUNTER: ICD-10-CM

## 2018-09-14 PROCEDURE — 72148 MRI LUMBAR SPINE W/O DYE: CPT | Mod: 26,,, | Performed by: RADIOLOGY

## 2018-09-14 PROCEDURE — 72148 MRI LUMBAR SPINE W/O DYE: CPT | Mod: TC

## 2018-09-19 ENCOUNTER — TELEPHONE (OUTPATIENT)
Dept: ORTHOPEDICS | Facility: CLINIC | Age: 73
End: 2018-09-19

## 2018-09-19 NOTE — TELEPHONE ENCOUNTER
----- Message from Nisreen Berg PA-C sent at 9/19/2018 10:28 AM CDT -----  Contact: Self  I just spoke with her.  Will you please call and see if she called again after I spoke with her or was this from before I spoke with her?    ----- Message -----  From: Lauro Borges MA  Sent: 9/19/2018  10:14 AM  To: Nisreen Berg PA-C        ----- Message -----  From: Cami Arreola  Sent: 9/19/2018   9:49 AM  To: Otis Nielson Staff    Pt is calling in regards of results from MRI can be reached @ [phone]

## 2018-09-28 ENCOUNTER — OFFICE VISIT (OUTPATIENT)
Dept: ORTHOPEDICS | Facility: CLINIC | Age: 73
End: 2018-09-28
Payer: MEDICARE

## 2018-09-28 VITALS — HEIGHT: 68 IN | WEIGHT: 259.94 LBS | BODY MASS INDEX: 39.4 KG/M2

## 2018-09-28 DIAGNOSIS — S32.010A CLOSED COMPRESSION FRACTURE OF FIRST LUMBAR VERTEBRA, INITIAL ENCOUNTER: ICD-10-CM

## 2018-09-28 PROCEDURE — 99213 OFFICE O/P EST LOW 20 MIN: CPT | Mod: S$PBB,,, | Performed by: ORTHOPAEDIC SURGERY

## 2018-09-28 PROCEDURE — 1101F PT FALLS ASSESS-DOCD LE1/YR: CPT | Mod: CPTII,,, | Performed by: ORTHOPAEDIC SURGERY

## 2018-09-28 PROCEDURE — 99212 OFFICE O/P EST SF 10 MIN: CPT | Mod: PBBFAC | Performed by: ORTHOPAEDIC SURGERY

## 2018-09-28 PROCEDURE — 99999 PR PBB SHADOW E&M-EST. PATIENT-LVL II: CPT | Mod: PBBFAC,,, | Performed by: ORTHOPAEDIC SURGERY

## 2018-09-30 PROBLEM — S32.010A CLOSED COMPRESSION FRACTURE OF L1 LUMBAR VERTEBRA: Status: ACTIVE | Noted: 2018-09-30

## 2018-09-30 NOTE — PROGRESS NOTES
The patient returns for follow up.    She has a history of an L1 compression fracture. She has been using a brace and is a little better since injury but is still not feeling great.    Her pain is worse with laying down.    Today I reviewed her recent MRI that demonstrates residual edema in the L1 body.    We discussed options today, I will discuss with Dr. Castillo re possible kyphoplasty.    I spent 15 minutes with the patient of which greater than 1/2 the time was devoted to counciling the patient regarding treatment options.

## 2018-10-08 ENCOUNTER — INITIAL CONSULT (OUTPATIENT)
Dept: INTERVENTIONAL RADIOLOGY/VASCULAR | Facility: CLINIC | Age: 73
End: 2018-10-08
Payer: MEDICARE

## 2018-10-08 VITALS
DIASTOLIC BLOOD PRESSURE: 78 MMHG | TEMPERATURE: 99 F | BODY MASS INDEX: 39.4 KG/M2 | HEIGHT: 68 IN | SYSTOLIC BLOOD PRESSURE: 155 MMHG | HEART RATE: 75 BPM | WEIGHT: 260 LBS

## 2018-10-08 DIAGNOSIS — M85.852 OSTEOPENIA OF BOTH HIPS: ICD-10-CM

## 2018-10-08 DIAGNOSIS — S32.010A CLOSED COMPRESSION FRACTURE OF FIRST LUMBAR VERTEBRA, INITIAL ENCOUNTER: Primary | ICD-10-CM

## 2018-10-08 DIAGNOSIS — M85.851 OSTEOPENIA OF BOTH HIPS: ICD-10-CM

## 2018-10-08 PROBLEM — M85.859 OSTEOPENIA OF HIP: Status: ACTIVE | Noted: 2018-10-08

## 2018-10-08 PROCEDURE — 99999 PR PBB SHADOW E&M-EST. PATIENT-LVL III: CPT | Mod: PBBFAC,,,

## 2018-10-08 PROCEDURE — 99213 OFFICE O/P EST LOW 20 MIN: CPT | Mod: PBBFAC

## 2018-10-08 PROCEDURE — 1101F PT FALLS ASSESS-DOCD LE1/YR: CPT | Mod: CPTII,,, | Performed by: NURSE PRACTITIONER

## 2018-10-08 PROCEDURE — 99203 OFFICE O/P NEW LOW 30 MIN: CPT | Mod: S$PBB,,, | Performed by: NURSE PRACTITIONER

## 2018-10-08 NOTE — PATIENT INSTRUCTIONS
You were seen by Isabela John DNP  If you should have additional questions/concens please call the clinic at [phone]  We will call you with a date for kyphoplasty

## 2018-10-08 NOTE — PROGRESS NOTES
"Subjective:       Patient ID: Lexii Gonzalez is a 73 y.o. female.    Chief Complaint: Consult    73 year old female sp fall after stepping backwards and loosing her footing and hitting her back and bottom on a truck while falling on 6/27/18. Patient endorses after fall she had bruising on her left knee and lower back. After her fall she couldn't get herself off the ground, and felt "the wind was knocked out of her". She had to roll onto her knees in order to get self up off the ground. The pain was in her knees and "below her bra strap" to the center of back. Pain got worse and became stiffer after the event, which eventually brought her to the emergency room that day. Her pain was a 10/10 at that time, today it is a 7/10.  Changing from sitting to walking or going from sitting to laying down exacerbates the pain. Changes in position make it worse. The only thing that has alleviated the pain is NORCO.  Additionally, Her bone mineral density of the left and right hip demonstrated osteopenia as classified by the WHO criteria. She and her daughter endorse that she has had significant changes in independence since her fall related to imbalance and pain, and has grown a great deal more immobile.         Review of Systems   Constitutional: Positive for activity change. Negative for appetite change, chills, diaphoresis and fever.   HENT: Negative for congestion, ear pain, facial swelling, hearing loss and mouth sores.    Eyes: Negative for photophobia, pain and discharge.   Respiratory: Negative for apnea, cough, choking, chest tightness, shortness of breath, wheezing and stridor.    Cardiovascular: Positive for leg swelling. Negative for chest pain.   Gastrointestinal: Positive for constipation. Negative for abdominal distention, abdominal pain, anal bleeding and blood in stool.   Genitourinary: Negative for difficulty urinating, dyspareunia, dysuria and hematuria.   Musculoskeletal: Positive for back pain. Negative for " "neck pain and neck stiffness.   Skin: Negative for color change, pallor, rash and wound.   Neurological: Negative for dizziness, seizures, syncope, light-headedness, numbness and headaches.   Hematological: Does not bruise/bleed easily.   Psychiatric/Behavioral: Positive for sleep disturbance. Negative for agitation, behavioral problems, confusion, decreased concentration, dysphoric mood, hallucinations, self-injury and suicidal ideas. The patient is not nervous/anxious.         Secondary to back pain         Past Medical History:   Diagnosis Date    Disorder of kidney and ureter 2016    High cholesterol     Hypertension     Osteoarthritis        Objective:       Vitals:    10/08/18 0949   BP: (!) 155/78   BP Location: Right arm   Pulse: 75   Temp: 98.8 °F (37.1 °C)   Weight: 117.9 kg (260 lb)   Height: 5' 8" (1.727 m)  Comment: 5' 8''       Physical Exam   Constitutional: She is oriented to person, place, and time. She appears well-developed and well-nourished. No distress.   HENT:   Head: Normocephalic and atraumatic.   Mouth/Throat: No oropharyngeal exudate.   Eyes: EOM are normal. Pupils are equal, round, and reactive to light. Right eye exhibits no discharge. Left eye exhibits no discharge. No scleral icterus.   Neck: Normal range of motion. Neck supple. No tracheal deviation present. No thyromegaly present.   Cardiovascular: Normal rate, regular rhythm, normal heart sounds and intact distal pulses. Exam reveals no gallop and no friction rub.   No murmur heard.  Pulmonary/Chest: Effort normal and breath sounds normal. No stridor. No respiratory distress. She has no wheezes. She has no rales.   Abdominal: Bowel sounds are normal. She exhibits no distension. There is no tenderness. There is no rebound and no guarding. A hernia is present.   Right sided hernia   Musculoskeletal: She exhibits edema. She exhibits no tenderness or deformity.   B/l lower extremity edema (2+)   Lymphadenopathy:     She has no " cervical adenopathy.   Neurological: She is alert and oriented to person, place, and time. No cranial nerve deficit.   Skin: Skin is warm and dry. No rash noted. She is not diaphoretic. No erythema.   Psychiatric: She has a normal mood and affect. Her behavior is normal. Thought content normal.       Assessment:       1. Closed compression fracture of first lumbar vertebra, initial encounter    2. Osteopenia of both hips        Review of Imaging:  MRI spine 9/14/18:  1. Acute on chronic compression fracture of the L1 vertebral body that demonstrates 50% height loss and mild retropulsion of the posterior-superior corner with resulting mild spinal canal stenosis and suspected abutment of the adjacent conus medullaris.  No cord signal abnormality to suggest edema.  2. Prominent multilevel degenerative changes most pronounced from L1-L2 through L4-L5 as detailed above.  Note is made of a large osteophyte extending into the right neural foramen from the adjacent facet joint with suspected abutment/compression of the exiting L4 nerve root.    DEXA 4/10/15:  1. Lumbar spine (L1-L4): Normal bone density  2. Left hip: Osteopenia  3. Right hip: Osteopenia.  Plan:       Ms. Gonzalez is a 72 yo F with pmhx of HTN, HLD, OA, and kidney disease who has been in a great deal of pain since June of 2018 sp fall. Her imaging reveal an L1 acute on chronic compression fracture. Dr. Castillo and I reviewed images and showed these to the patient and her daughter. She has a L1 compression fracture which looks ammenable to kyphoplasty. Dr. Castillo also discussed placing some cement at T12 as well. Per his radiographic interpretation there is some edema present at this level. This may be indicative of an early fracture. We discussed it likely being the cause of her pain, but that she also had some prominent multilevel degenerative changes and a large osteophyte that may be contributing to her discomfort. Overall, we discussed that the procedure  would be aimed at relieving the pain and getting her back to her baseline functioning/mobility. We discussed how the procedure will be performed, risk/benefits, possible complications, and pre-post procedure expectations. I also reviewed importance of taking BP medications and diuretic. On exam today her BP was elevated and she had significant lower extremity edema. I encouraged her to see her PCP/cardiologist to follow up on these symptoms and to have tighter BP management, as well as to follow up on her osteopenia management/treatment. Her questions, comments, concerns were addresses, and overall she felt satisfied for her care. I informed the patient and her daughter we would reach out to her this week to schedule her for L1/T12 Kyphoplasty with Dr. Nasir Castillo.

## 2018-10-09 DIAGNOSIS — S32.010A CLOSED COMPRESSION FRACTURE OF FIRST LUMBAR VERTEBRA, INITIAL ENCOUNTER: Primary | ICD-10-CM

## 2018-10-17 ENCOUNTER — TELEPHONE (OUTPATIENT)
Dept: INTERVENTIONAL RADIOLOGY/VASCULAR | Facility: HOSPITAL | Age: 73
End: 2018-10-17

## 2018-10-17 VITALS — WEIGHT: 261 LBS | HEIGHT: 68 IN | BODY MASS INDEX: 39.56 KG/M2

## 2018-10-17 DIAGNOSIS — S32.010A CLOSED COMPRESSION FRACTURE OF FIRST LUMBAR VERTEBRA, INITIAL ENCOUNTER: Primary | ICD-10-CM

## 2018-10-18 ENCOUNTER — HOSPITAL ENCOUNTER (OUTPATIENT)
Facility: HOSPITAL | Age: 73
Discharge: HOME | End: 2018-10-18
Attending: RADIOLOGY | Admitting: RADIOLOGY
Payer: MEDICARE

## 2018-10-18 VITALS
DIASTOLIC BLOOD PRESSURE: 50 MMHG | WEIGHT: 261 LBS | TEMPERATURE: 98 F | OXYGEN SATURATION: 96 % | SYSTOLIC BLOOD PRESSURE: 132 MMHG | BODY MASS INDEX: 39.56 KG/M2 | RESPIRATION RATE: 18 BRPM | HEIGHT: 68 IN | HEART RATE: 68 BPM

## 2018-10-18 DIAGNOSIS — S32.010A CLOSED COMPRESSION FRACTURE OF FIRST LUMBAR VERTEBRA, INITIAL ENCOUNTER: ICD-10-CM

## 2018-10-18 DIAGNOSIS — S22.000S COMPRESSION FX, THORACIC SPINE, SEQUELA: ICD-10-CM

## 2018-10-18 PROCEDURE — 63600175 PHARM REV CODE 636 W HCPCS: Performed by: RADIOLOGY

## 2018-10-18 RX ORDER — FENTANYL CITRATE 50 UG/ML
INJECTION, SOLUTION INTRAMUSCULAR; INTRAVENOUS CODE/TRAUMA/SEDATION MEDICATION
Status: COMPLETED | OUTPATIENT
Start: 2018-10-18 | End: 2018-10-18

## 2018-10-18 RX ORDER — CEFAZOLIN SODIUM 1 G/50ML
SOLUTION INTRAVENOUS
Status: COMPLETED | OUTPATIENT
Start: 2018-10-18 | End: 2018-10-18

## 2018-10-18 RX ORDER — MIDAZOLAM HYDROCHLORIDE 1 MG/ML
INJECTION INTRAMUSCULAR; INTRAVENOUS CODE/TRAUMA/SEDATION MEDICATION
Status: COMPLETED | OUTPATIENT
Start: 2018-10-18 | End: 2018-10-18

## 2018-10-18 RX ORDER — SODIUM CHLORIDE 9 MG/ML
500 INJECTION, SOLUTION INTRAVENOUS ONCE
Status: DISCONTINUED | OUTPATIENT
Start: 2018-10-18 | End: 2018-10-18 | Stop reason: HOSPADM

## 2018-10-18 RX ORDER — CEFAZOLIN SODIUM 1 G/3ML
1 INJECTION, POWDER, FOR SOLUTION INTRAMUSCULAR; INTRAVENOUS
Status: DISCONTINUED | OUTPATIENT
Start: 2018-10-18 | End: 2018-10-18 | Stop reason: HOSPADM

## 2018-10-18 RX ADMIN — MIDAZOLAM HYDROCHLORIDE 0.5 MG: 1 INJECTION, SOLUTION INTRAMUSCULAR; INTRAVENOUS at 11:10

## 2018-10-18 RX ADMIN — CEFAZOLIN SODIUM 1 G: 1 SOLUTION INTRAVENOUS at 11:10

## 2018-10-18 RX ADMIN — MIDAZOLAM HYDROCHLORIDE 1 MG: 1 INJECTION, SOLUTION INTRAMUSCULAR; INTRAVENOUS at 11:10

## 2018-10-18 RX ADMIN — MIDAZOLAM HYDROCHLORIDE 0.5 MG: 1 INJECTION, SOLUTION INTRAMUSCULAR; INTRAVENOUS at 12:10

## 2018-10-18 RX ADMIN — FENTANYL CITRATE 25 MCG: 50 INJECTION, SOLUTION INTRAMUSCULAR; INTRAVENOUS at 11:10

## 2018-10-18 RX ADMIN — FENTANYL CITRATE 25 MCG: 50 INJECTION, SOLUTION INTRAMUSCULAR; INTRAVENOUS at 12:10

## 2018-10-18 RX ADMIN — FENTANYL CITRATE 50 MCG: 50 INJECTION, SOLUTION INTRAMUSCULAR; INTRAVENOUS at 11:10

## 2018-10-18 NOTE — DISCHARGE INSTRUCTIONS
Please call with any questions or concerns.      Monday thru Friday 8:00 am - 4:30 pm    Interventional Radiology   [phone]    After Hours    Ask for the Radiology Intern on call  [phone]

## 2018-10-18 NOTE — H&P
Radiology History & Physical      SUBJECTIVE:     Chief Complaint: low back pain    History of Present Illness:  Lexii Gonzalez is a 73 y.o. female with T12 and L1 compression fractures  who presents for kyphoplasty/ vertebroplasty.  Past Medical History:   Diagnosis Date    Disorder of kidney and ureter 2016    High cholesterol     Hypertension     Osteoarthritis      Past Surgical History:   Procedure Laterality Date    APPENDECTOMY  1975     SECTION      x 3-, ,     CHOLECYSTECTOMY  1974    ELBOW SURGERY Right 2001    removal of lipoma    ENDOMETRIAL ABLATION  2002    INCISIONAL HERNIA REPAIR  1996    TUBAL LIGATION  1978       Home Meds:   Prior to Admission medications    Medication Sig Start Date End Date Taking? Authorizing Provider   allopurinol (ZYLOPRIM) 100 MG tablet Take 100 mg by mouth once daily.   Yes Historical Provider, MD   amlodipine (NORVASC) 10 MG tablet Take 10 mg by mouth once daily.   Yes Historical Provider, MD   doxazosin (CARDURA) 8 MG Tab TK 2 TS PO QD FOR BP 17  Yes Historical Provider, MD   fenofibrate (TRICOR) 145 MG tablet Take 145 mg by mouth once daily.   Yes Historical Provider, MD   metoprolol succinate (TOPROL-XL) 50 MG 24 hr tablet TK 1.5 T PO D 17  Yes Historical Provider, MD   VESICARE 10 mg tablet TK 1 T PO D 17  Yes Historical Provider, MD   cyclobenzaprine (FLEXERIL) 10 MG tablet TK 1 T PO TID PRN 18   Historical Provider, MD   hydrochlorothiazide (HYDRODIURIL) 25 MG tablet TK 1 T PO QD UTD FOR BP AND SWELLING 17   Historical Provider, MD   HYDROcodone-acetaminophen (NORCO) 7.5-325 mg per tablet Take 1 tablet by mouth every 12 (twelve) hours as needed for Pain. 18   Nisreen Berg PA-C     Anticoagulants/Antiplatelets: no anticoagulation    Allergies:   Review of patient's allergies indicates:   Allergen Reactions    Ditropan [oxybutynin chloride]      DIZZINESS    Lisinopril Rash    Lotensin  [benazepril] Rash     Sedation History:  no adverse reactions    Review of Systems:   Hematological: no known coagulopathies  Respiratory: no shortness of breath  Cardiovascular: no chest pain  Gastrointestinal: no abdominal pain  Genito-Urinary: no dysuria  Musculoskeletal: low back pain  Neurological: no TIA or stroke symptoms         OBJECTIVE:     Vital Signs (Most Recent)  Pulse: 65 (10/18/18 1038)  Resp: 18 (10/18/18 1038)  BP: (!) 146/63 (10/18/18 1046)  SpO2: 98 % (10/18/18 1038)    Physical Exam:  ASA: 2  Mallampati: 2    General: no acute distress  Mental Status: alert and oriented to person, place and time  HEENT: normocephalic, atraumatic  Chest: unlabored breathing  Heart: regular heart rate  Abdomen: nondistended  Extremity: moves all extremities    Laboratory  Lab Results   Component Value Date    INR 1.0 10/18/2018       Lab Results   Component Value Date    WBC 5.77 10/18/2018    HGB 12.1 10/18/2018    HCT 39.2 10/18/2018    MCV 91 10/18/2018     10/18/2018    No results found for: GLU, NA, K, CL, CO2, BUN, CREATININE, CALCIUM, MG, ALT, AST, ALBUMIN, BILITOT, BILIDIR    ASSESSMENT/PLAN:     Sedation Plan: moderate  Patient will undergo treatment of T12 and L1 compression deformities.    Lenin Bardales  Radiology PGY-5

## 2018-10-18 NOTE — PROCEDURES
Radiology Post-Procedure Note    Pre Op Diagnosis: T12 and L1 compression fractures    Post Op Diagnosis: same    Procedure: T12 vertebroplasty and L1 kyphoplasty    Procedure performed by: Nasir Castillo MD; Lenin Bardales (resident)    Written Informed Consent Obtained: Yes    Specimen Removed: NO    Estimated Blood Loss: less than 50     Findings: Local anesthesia and moderate sedation were used.    Under fluoroscopic surveillance,  vertebroplasty was performed using methylmethacrylate injected via percutaneous left transpedicular approach at the level of T12.   Kyphoplasty was performed using 15mm ballooon and methylmethacrylate injected via percutaneous righttranspedicular approach at the level of L1.The patient tolerated the procedure well and there were no complications.    Lenin Bardales  Radiology PGY-5

## 2018-10-18 NOTE — PROGRESS NOTES
Dr. Bardales spoke with pt, okay to d/c.   Discharge instructions reviewed with pt & dgtr, both verbalize understanding.  Instructions include medications, self/site care, and when to call a physician.  IV removed, DSD applied.  Pt wheeled to door, family has belongings.

## 2018-11-06 ENCOUNTER — OFFICE VISIT (OUTPATIENT)
Dept: INTERVENTIONAL RADIOLOGY/VASCULAR | Facility: CLINIC | Age: 73
End: 2018-11-06
Payer: MEDICARE

## 2018-11-06 VITALS
DIASTOLIC BLOOD PRESSURE: 67 MMHG | SYSTOLIC BLOOD PRESSURE: 151 MMHG | WEIGHT: 267.63 LBS | BODY MASS INDEX: 40.56 KG/M2 | HEART RATE: 64 BPM | HEIGHT: 68 IN

## 2018-11-06 DIAGNOSIS — Z98.890 S/P KYPHOPLASTY: ICD-10-CM

## 2018-11-06 DIAGNOSIS — M85.852 OSTEOPENIA OF BOTH HIPS: ICD-10-CM

## 2018-11-06 DIAGNOSIS — S22.000S COMPRESSION FX, THORACIC SPINE, SEQUELA: Primary | ICD-10-CM

## 2018-11-06 DIAGNOSIS — S32.010A CLOSED COMPRESSION FRACTURE OF FIRST LUMBAR VERTEBRA, INITIAL ENCOUNTER: ICD-10-CM

## 2018-11-06 DIAGNOSIS — M85.851 OSTEOPENIA OF BOTH HIPS: ICD-10-CM

## 2018-11-06 DIAGNOSIS — I10 HYPERTENSION, UNSPECIFIED TYPE: ICD-10-CM

## 2018-11-06 PROCEDURE — 99214 OFFICE O/P EST MOD 30 MIN: CPT | Mod: 25,S$GLB,, | Performed by: NURSE PRACTITIONER

## 2018-11-06 PROCEDURE — 96127 BRIEF EMOTIONAL/BEHAV ASSMT: CPT | Mod: S$GLB,,, | Performed by: NURSE PRACTITIONER

## 2018-11-06 PROCEDURE — 1101F PT FALLS ASSESS-DOCD LE1/YR: CPT | Mod: CPTII,S$GLB,, | Performed by: NURSE PRACTITIONER

## 2018-11-06 PROCEDURE — 99999 PR PBB SHADOW E&M-EST. PATIENT-LVL III: CPT | Mod: PBBFAC,,,

## 2018-11-06 NOTE — Clinical Note
Hi Dr. Muñoz,We recently treated you patient with kyphoplasty for an L1 compression fracture.Just wanted to make you aware and keep you in the loop as you are following her bone health. Kind regards,Isabela John, DNP

## 2018-11-06 NOTE — PROGRESS NOTES
"Subjective:       Patient ID: Lexii Gonzalez is a 73 y.o. female.    Chief Complaint: No chief complaint on file.    73 year old female sp fall after stepping backwards and loosing her footing and hitting her back and bottom on a truck while falling on 6/27/18. Patient endorses after fall she had bruising on her left knee and lower back. After her fall she couldn't get herself off the ground, and felt "the wind was knocked out of her". She had to roll onto her knees in order to get self up off the ground. The pain was in her knees and "below her bra strap" to the center of back. Pain got worse and became stiffer after the event, which eventually brought her to the emergency room that day. Her pain was a 10/10 at that time. Additionally, her bone mineral density of the left and right hip demonstrated osteopenia as classified by the WHO criteria. Today she sp kyphoplasty with Dr. Castillo. She endorses her pain is 0/10. She feels she is "100%" better since her procedure. Shes much more mobile is on a stationary bike now, and is walking outside daily. I had the patient fill out a PHQ-9 depression screening questionaire given her history of chronic pain.       Review of Systems   Constitutional: Negative for activity changes, feels back to baseline. Negative for appetite change, chills, diaphoresis and fever.   HENT: Negative for congestion, ear pain, facial swelling, hearing loss and mouth sores.    Eyes: Negative for photophobia, pain and discharge.   Respiratory: Negative for apnea, cough, choking, chest tightness, shortness of breath, wheezing and stridor.    Cardiovascular: Positive for leg swelling. Negative for chest pain.   Gastrointestinal: Negative for constipation. Negative for abdominal distention, abdominal pain, anal bleeding and blood in stool.   Genitourinary: Negative for difficulty urinating, dyspareunia, dysuria and hematuria.   Musculoskeletal: Negative for back pain.  Negative for neck pain and neck " "stiffness.   Skin: Negative for color change, pallor, rash and wound.   Neurological: Negative for dizziness, seizures, syncope, light-headedness, numbness and headaches.   Hematological: Does not bruise/bleed easily.   Psychiatric/Behavioral: Negative for sleep disturbances. Negative for agitation, behavioral problems, confusion, decreased concentration, dysphoric mood, hallucinations, self-injury and suicidal ideas. The patient is not nervous/anxious.        Past Medical History:   Diagnosis Date    Disorder of kidney and ureter 2016    High cholesterol     Hypertension     Osteoarthritis      History reviewed. No pertinent family history.      Current Outpatient Medications:     allopurinol (ZYLOPRIM) 100 MG tablet, Take 100 mg by mouth once daily., Disp: , Rfl:     amlodipine (NORVASC) 10 MG tablet, Take 10 mg by mouth once daily., Disp: , Rfl:     cyclobenzaprine (FLEXERIL) 10 MG tablet, TK 1 T PO TID PRN, Disp: , Rfl: 3    doxazosin (CARDURA) 8 MG Tab, TK 2 TS PO QD FOR BP, Disp: , Rfl: 1    fenofibrate (TRICOR) 145 MG tablet, Take 145 mg by mouth once daily., Disp: , Rfl:     hydrochlorothiazide (HYDRODIURIL) 25 MG tablet, TK 1 T PO QD UTD FOR BP AND SWELLING, Disp: , Rfl: 3    HYDROcodone-acetaminophen (NORCO) 7.5-325 mg per tablet, Take 1 tablet by mouth every 12 (twelve) hours as needed for Pain., Disp: 42 tablet, Rfl: 0    metoprolol succinate (TOPROL-XL) 50 MG 24 hr tablet, TK 1.5 T PO D, Disp: , Rfl: 3    VESICARE 10 mg tablet, TK 1 T PO D, Disp: , Rfl: 3    PHQ-9 depression screening questionaire scanned into chart by Carmen Karimi MA; score of 0, negative screen.    Objective:       Vitals:    11/06/18 1115   BP: (!) 151/67   Pulse: 64   Weight: 121.4 kg (267 lb 9.6 oz)   Height: 5' 8" (1.727 m)       Physical Exam   Constitutional: She is oriented to person, place, and time. She appears well-developed and well-nourished. No distress.   HENT:   Head: Normocephalic and atraumatic. "   Mouth/Throat: No oropharyngeal exudate.   Eyes: EOM are normal. Pupils are equal, round, and reactive to light. Right eye exhibits no discharge. Left eye exhibits no discharge. No scleral icterus.   Neck: Normal range of motion. Neck supple. No tracheal deviation present. No thyromegaly present.   Cardiovascular: Normal rate, regular rhythm, normal heart sounds and intact distal pulses. Exam reveals no gallop and no friction rub.   No murmur heard.  Pulmonary/Chest: Effort normal and breath sounds normal. No stridor. No respiratory distress. She has no wheezes. She has no rales.   Abdominal: Bowel sounds are normal. She exhibits no distension. There is no tenderness. There is no rebound and no guarding. A hernia is present.   Right sided hernia   Musculoskeletal: She exhibits edema. She exhibits no tenderness or deformity.   B/l lower extremity edema (2+)   Lymphadenopathy:     She has no cervical adenopathy.   Neurological: She is alert and oriented to person, place, and time. No cranial nerve deficit.   Skin: Skin is warm and dry. No rash noted. She is not diaphoretic. No erythema.   Psychiatric: She has a normal mood and affect. Her behavior is normal. Thought content normal.       Assessment:       1. Compression fx, thoracic spine, sequela    2. Closed compression fracture of first lumbar vertebra, initial encounter    3. S/P kyphoplasty    4. Osteopenia of both hips    5. Hypertension, unspecified type        Review of Imaging:  MRI spine 9/14/18:  1. Acute on chronic compression fracture of the L1 vertebral body that demonstrates 50% height loss and mild retropulsion of the posterior-superior corner with resulting mild spinal canal stenosis and suspected abutment of the adjacent conus medullaris.  No cord signal abnormality to suggest edema.  2. Prominent multilevel degenerative changes most pronounced from L1-L2 through L4-L5 as detailed above.  Note is made of a large osteophyte extending into the right  neural foramen from the adjacent facet joint with suspected abutment/compression of the exiting L4 nerve root.    DEXA 4/10/15:  1. Lumbar spine (L1-L4): Normal bone density  2. Left hip: Osteopenia  3. Right hip: Osteopenia.    Kyphoplasty 10/18/18:  L1 compression deformity, similar to a prior MRI examination.  Post vertebroplasty images demonstrate good position of the cement within the T12 and L1 vertebral bodies without evidence of complication.  Aortic atherosclerosis incidentally noted.  Plan:       Ms. Gonzalez is a 74 yo F with pmhx of HTN, HLD, OA, and kidney disease who has been in a great deal of pain since June of 2018 sp fall. She is status post kyphoplasty for an L1 compression fracture. She has no neurological deficits at this time, and still ambulates with a walker due to her knee pain. She endorses she has total resolution of her pain and is doing much better. She overall seems to be much more active and is getting up and walking around daily and using a stationary bike. I reviewed importance of taking BP medications and diuretic. On exam today her BP was elevated and she had significant lower extremity edema. I encouraged her to see her PCP/cardiologist to follow up on these symptoms and to have tighter BP management, as well as to follow up on her osteopenia management/treatment. I finally reviewed the results of the PHQ-9 questionnaire with her and discussed what they meant. Her questions, comments, concerns were addresses, and overall she felt satisfied for her care. The patient is aware she can call the office at any point in time with any new questions/concerns. Card was given with clinic phone number. Overall she seemed satisfied with her care. Follow up with neurointerventional radiology as needed.

## 2018-12-14 RX ORDER — TRAMADOL HYDROCHLORIDE 50 MG/1
TABLET ORAL
Qty: 60 TABLET | Refills: 0 | Status: SHIPPED | OUTPATIENT
Start: 2018-12-14 | End: 2019-06-24

## 2019-01-21 NOTE — PROGRESS NOTES
"Subjective:       Patient ID: Lexii Gonzalez is a 72 y.o. female.    Vitals:  height is 5' 8" (1.727 m) and weight is 115.2 kg (254 lb). Her temperature is 98.5 °F (36.9 °C). Her blood pressure is 141/69 (abnormal) and her pulse is 78. Her respiration is 18 and oxygen saturation is 98%.     Chief Complaint: URI and Shoulder Pain (Pt c/o lt shoulder pain after she used her elbow to brace herself to get up.)    C/o chest congestion, nasal congestion, productive cough, hoarseness, and sore throat that started on Thursday evening. Some body aches.   Also c/o left shoulder pain since Wednesday. Reports she was getting out of bed and put her weight on her left elbow and felt " 3 cracks in her left shoulder" reports it happened again the following night and now she has limited ROM to left shoulder. Unable to raise her arm above her shoulder and reports extreme pain when putting her bra on in the morning.       URI    This is a new problem. The current episode started in the past 7 days. There has been no fever. Associated symptoms include congestion, coughing, joint pain, sinus pain, a sore throat and wheezing. Pertinent negatives include no abdominal pain, chest pain, diarrhea, ear pain, headaches, nausea, rash or vomiting. She has tried nothing for the symptoms.     Review of Systems   Constitution: Negative for chills, fever and malaise/fatigue.   HENT: Positive for congestion, sinus pain and sore throat. Negative for ear pain and hoarse voice.    Eyes: Negative for blurred vision, discharge and redness.   Cardiovascular: Negative for chest pain, dyspnea on exertion and leg swelling.   Respiratory: Positive for cough, sputum production and wheezing. Negative for shortness of breath.    Skin: Negative for rash.   Musculoskeletal: Positive for joint pain. Negative for back pain and myalgias.   Gastrointestinal: Negative for abdominal pain, diarrhea, nausea and vomiting.   Neurological: Negative for headaches. "   Psychiatric/Behavioral: The patient is not nervous/anxious.        Objective:      Physical Exam   Constitutional: She is oriented to person, place, and time. She appears well-developed and well-nourished. She is cooperative.  Non-toxic appearance. She does not appear ill. No distress.   Non productive cough during the exam.    HENT:   Head: Normocephalic and atraumatic.   Right Ear: Hearing, external ear and ear canal normal. A middle ear effusion (clear) is present.   Left Ear: Hearing, external ear and ear canal normal. A middle ear effusion (clear) is present.   Nose: Mucosal edema and rhinorrhea present. No nasal deformity. No epistaxis. Right sinus exhibits no maxillary sinus tenderness and no frontal sinus tenderness. Left sinus exhibits no maxillary sinus tenderness and no frontal sinus tenderness.   Mouth/Throat: Uvula is midline and mucous membranes are normal. No trismus in the jaw. Normal dentition. No uvula swelling. Posterior oropharyngeal erythema present. No tonsillar exudate.   + hoarseness  +PND.    Eyes: Conjunctivae and lids are normal. No scleral icterus.   Sclera clear bilat   Neck: Trachea normal, full passive range of motion without pain and phonation normal. Neck supple.   Cardiovascular: Normal rate, regular rhythm, normal heart sounds, intact distal pulses and normal pulses.    Pulmonary/Chest: Effort normal and breath sounds normal. No respiratory distress.   Abdominal: Soft. Normal appearance and bowel sounds are normal. She exhibits no distension. There is no tenderness.   Musculoskeletal: She exhibits no edema or deformity.        Left shoulder: She exhibits decreased range of motion and tenderness. She exhibits no swelling and no crepitus.        Arms:  Lymphadenopathy:     She has no cervical adenopathy.   Neurological: She is alert and oriented to person, place, and time. She exhibits normal muscle tone. Coordination normal.   Skin: Skin is warm, dry and intact. She is not  diaphoretic. No pallor.   Psychiatric: She has a normal mood and affect. Her speech is normal and behavior is normal. Judgment and thought content normal. Cognition and memory are normal.   Nursing note and vitals reviewed.      Results for orders placed or performed in visit on 01/06/18   POCT Influenza A/B   Result Value Ref Range    Rapid Influenza A Ag Negative Negative    Rapid Influenza B Ag Negative Negative     Acceptable Yes      A 3-view shoulder X-Ray was ordered. Read by radiologist as No fracture dislocation bone destruction seen. There is mild DJD.      Assessment:       1. Upper respiratory infection with cough and congestion    2. Acute pain of left shoulder        Plan:         Upper respiratory infection with cough and congestion  -     POCT Influenza A/B  -     fluticasone (FLONASE) 50 mcg/actuation nasal spray; 2 sprays (100 mcg total) by Each Nare route once daily.  Dispense: 1 Bottle; Refill: 0  -     benzonatate (TESSALON PERLES) 100 MG capsule; Take 2 capsules (200 mg total) by mouth 3 (three) times daily as needed for Cough.  Dispense: 30 capsule; Refill: 0  -     guaifenesin-codeine 100-10 mg/5 ml (TUSSI-ORGANIDIN NR)  mg/5 mL syrup; Take 5 mLs by mouth nightly as needed for Cough.  Dispense: 90 mL; Refill: 0  -     betamethasone acetate-betamethasone sodium phosphate injection 6 mg; Inject 1 mL (6 mg total) into the muscle one time.  -     azithromycin (ZITHROMAX Z-DUGLAS) 250 MG tablet; Take 2 tablets (500 mg) on  Day 1,  followed by 1 tablet (250 mg) once daily on Days 2 through 5.  Dispense: 6 tablet; Refill: 0    Acute pain of left shoulder  -     X-Ray Shoulder Trauma 3 view Left; Future; Expected date: 01/06/2018      She requested steroid injection today.   Take Coricidin HBP for decongestant instead of OTC cough/cold medicine due to your high blood pressure.   OTC zyrtec  She is requesting a Z-duglas to hold onto. Printed RX. Instructed to hold unless s/s worsen.    Reports hx of CKD stage 3- discussed OTC Tylenol for pain instead of NSAIDs and follow up with PCP.     Follow up with your doctor in a few days as needed.  Return to the urgent care or go to the ER if symptoms get worse.         (3) no apparent problem

## 2019-06-24 ENCOUNTER — HOSPITAL ENCOUNTER (EMERGENCY)
Facility: HOSPITAL | Age: 74
Discharge: HOME | End: 2019-06-24
Attending: EMERGENCY MEDICINE
Payer: MEDICARE

## 2019-06-24 VITALS
BODY MASS INDEX: 39.4 KG/M2 | TEMPERATURE: 98 F | DIASTOLIC BLOOD PRESSURE: 86 MMHG | HEART RATE: 69 BPM | WEIGHT: 260 LBS | HEIGHT: 68 IN | OXYGEN SATURATION: 97 % | RESPIRATION RATE: 18 BRPM | SYSTOLIC BLOOD PRESSURE: 168 MMHG

## 2019-06-24 DIAGNOSIS — M25.562 ACUTE PAIN OF LEFT KNEE: Primary | ICD-10-CM

## 2019-06-24 PROCEDURE — 99284 EMERGENCY DEPT VISIT MOD MDM: CPT | Mod: 25

## 2019-06-24 PROCEDURE — 63600175 PHARM REV CODE 636 W HCPCS: Performed by: EMERGENCY MEDICINE

## 2019-06-24 PROCEDURE — 96372 THER/PROPH/DIAG INJ SC/IM: CPT

## 2019-06-24 RX ORDER — HYDROCODONE BITARTRATE AND ACETAMINOPHEN 7.5; 325 MG/1; MG/1
1 TABLET ORAL EVERY 6 HOURS PRN
COMMUNITY

## 2019-06-24 RX ORDER — TRAMADOL HYDROCHLORIDE 50 MG/1
50 TABLET ORAL EVERY 12 HOURS PRN
Qty: 6 TABLET | Refills: 0 | Status: SHIPPED | OUTPATIENT
Start: 2019-06-24 | End: 2019-06-27

## 2019-06-24 RX ORDER — MORPHINE SULFATE 2 MG/ML
6 INJECTION, SOLUTION INTRAMUSCULAR; INTRAVENOUS
Status: COMPLETED | OUTPATIENT
Start: 2019-06-24 | End: 2019-06-24

## 2019-06-24 RX ADMIN — MORPHINE SULFATE 6 MG: 2 INJECTION, SOLUTION INTRAMUSCULAR; INTRAVENOUS at 06:06

## 2019-06-24 NOTE — ED NOTES
"Pt presents to ED with c/o of left knee pain.  States "I went to get up from the dinner table and heard a tear.  I tried 3 more times and it was giving out on me."  Pt reports she has been needing CARLITOS knee replacements for a few years now but has not gotten to it.  Pt uses walker to get around.  Swelling noted in CARLITOS knees.  Edema present BLE.  Hx of CKD.  Respirations are even and unlabored. Daughter at bedside.    "

## 2019-06-25 NOTE — ED NOTES
Report received from Graciela RN/Jane RN. Assumed care of pt at this time. Pt aaox3. rr even and unlabored on ra. Pt sitting up talking with daughter. Dr. Monroy at bedside updating pt and daughter on test results. Pt and daughter verbalized understanding.

## 2019-06-25 NOTE — ED PROVIDER NOTES
Encounter Date: 2019    SCRIBE #1 NOTE: I, Jose Lopez , am scribing for, and in the presence of,  Dr. Monroy . I have scribed the entire note.       History     Chief Complaint   Patient presents with    Leg Pain     Pt reports has bad knees and when she went to get up today was unable to bare weight to L side. States has a pulling pain to L knee everytime she attempts to stand.      Time seen by provider: 7:20 PM    This is a 73 y.o. female who presents with complaint of Left leg pain. Pt reports that last night while at the table, when she tried to stand up she states she felt a pulling/popping sensation in her left knee that wouldn't allow her to stand. She notes that while trying to walk on it, the knee continued to give out. She states that the pain started on the outside of the knee and radiated inward. Pt denies any cough or cold symptoms, N/V/D, CP or any other symptoms. Patient states she is seen by Dr. Castaneda and is planning for b/l knee replacements.     The history is provided by the patient.     Review of patient's allergies indicates:   Allergen Reactions    Ditropan [oxybutynin chloride]      DIZZINESS    Lisinopril Rash    Lotensin [benazepril] Rash     Past Medical History:   Diagnosis Date    Disorder of kidney and ureter 2016    High cholesterol     Hypertension     Osteoarthritis      Past Surgical History:   Procedure Laterality Date    APPENDECTOMY  1975     SECTION      x 3-, ,     CHOLECYSTECTOMY  1974    ELBOW SURGERY Right 2001    removal of lipoma    ENDOMETRIAL ABLATION  2002    INCISIONAL HERNIA REPAIR  1996    TUBAL LIGATION  1978     History reviewed. No pertinent family history.  Social History     Tobacco Use    Smoking status: Never Smoker    Smokeless tobacco: Never Used   Substance Use Topics    Alcohol use: Yes     Comment: maybe once a yr if that    Drug use: No     Review of Systems   Constitutional: Negative for  chills and fever.   HENT: Negative for sore throat.    Respiratory: Negative for cough and shortness of breath.    Cardiovascular: Negative for chest pain.   Gastrointestinal: Negative for nausea and vomiting.   Genitourinary: Negative for dysuria, frequency and urgency.   Musculoskeletal: Positive for arthralgias (left leg pain ). Negative for back pain, neck pain and neck stiffness.   Skin: Negative for rash and wound.   Neurological: Negative for syncope and weakness.   Hematological: Does not bruise/bleed easily.   Psychiatric/Behavioral: Negative for agitation, behavioral problems and confusion.       Physical Exam     Initial Vitals [06/24/19 1754]   BP Pulse Resp Temp SpO2   (!) 176/84 69 20 97.9 °F (36.6 °C) 98 %      MAP       --         Physical Exam    Constitutional:  Non-toxic appearance. No distress.   HENT:   Head: Normocephalic and atraumatic.   Eyes: Conjunctivae are normal. Pupils are equal, round, and reactive to light. Right eye exhibits no nystagmus. Left eye exhibits no nystagmus.   Cardiovascular: S1 normal, S2 normal and intact distal pulses.   Pulmonary/Chest: No respiratory distress.   Musculoskeletal:   L knee:  FROM no erythema warmth or fluctuance  Diffuse tenderness along patella proximal medial/ lateral tibia  No joint laxity lachman/ mir negative  no calf ttp  no erythema   Neurological: She is alert. No cranial nerve deficit. GCS eye subscore is 4. GCS verbal subscore is 5. GCS motor subscore is 6.   Skin: Skin is warm. No rash noted.   No warmth     Psychiatric: She has a normal mood and affect. Her behavior is normal.         ED Course   Procedures  Labs Reviewed - No data to display       Imaging Results          X-Ray Knee 1 or 2 View Left (Final result)  Result time 06/24/19 18:47:11    Final result by Ingrid Caba MD (06/24/19 18:47:11)                 Impression:      Tricompartmental osteoarthritis with suprapatellar joint effusion.  No acute osseous abnormality  identified.      Electronically signed by: Ingrid Caba MD  Date:    06/24/2019  Time:    18:47             Narrative:    EXAMINATION:  XR KNEE 1 OR 2 VIEW LEFT    CLINICAL HISTORY:  left knee pain;    COMPARISON:  None    FINDINGS:  No evidence of acute displaced fracture or dislocation.  There is tricompartmental osteoarthritis.  Joint space narrowing is seen most prominently involving the lateral tibiofemoral compartment.  Suprapatellar joint effusion is seen.  There is prominent spurring involving the patellofemoral compartment.                                 Medical Decision Making:   Differential Diagnosis:   Differential Diagnosis includes, but is not limited to:  Fracture, dislocation, compartment syndrome, nerve injury/palsy, vascular injury, rhabdomyolysis, hemarthrosis, septic joint, bursitis, muscle strain, ligament tear/sprain, laceration with foreign body, abrasion, soft tissue contusion, osteoarthritis.    ED Management:  73 year old female with acute on chronic knee pain    No evidence to suggest ligament injury    XR negative for fracture    Will dc with tramadol advised caution as it can cause drowsiness - patient has taken before without ill effect.    Follow up with Dr. Castaneda in 2-4 days return precautions for worsening pain weakness numbness swelling or any other concern.    After taking into careful account the historical factors and physical exam findings of the patient's presentation today, in conjunction with the empirical and objective data obtained on ED workup, no acute emergent medical condition has been identified. The patient appears to be low risk for an emergent medical condition and I feel it is safe and appropriate at this time for the patient to be discharged to follow-up as detailed in their discharge instructions for reevaluation and possible continued outpatient workup and management. I have discussed the specifics of the workup with the patient and the patient has verbalized  understanding of the details of the workup, the diagnosis, the treatment plan, and the need for outpatient follow-up.  Although the patient has no emergent etiology today this does not preclude the development of an emergent condition so in addition, I have advised the patient that they can return to the ED and/or activate EMS at any time with worsening of their symptoms, change of their symptoms, or with any other medical complaint.  The patient remained comfortable and stable during their visit in the ED.  Discharge and follow-up instructions discussed with the patient who expressed understanding and willingness to comply with my recommendations.                        Clinical Impression:     1. Acute pain of left knee         Scribe Attestation I, Deangelo Monroy,  personally performed the services described in this documentation. All medical record entries made by the scribe were at my direction and in my presence.  I have reviewed the chart and agree that the record reflects my personal performance and is accurate and complete. Deangelo Monroy M.D. 5:45 PM06/25/2019                      Deangelo Monroy Jr., MD  06/25/19 [id number]

## 2019-07-30 ENCOUNTER — TELEPHONE (OUTPATIENT)
Dept: OBSTETRICS AND GYNECOLOGY | Facility: CLINIC | Age: 74
End: 2019-07-30

## 2019-07-30 NOTE — TELEPHONE ENCOUNTER
Called and left a voicemail for a call back. We have received a referral. Need to schedule appointment. Referral is scheduled in pt's chart. Will call emergency next.

## 2019-07-30 NOTE — TELEPHONE ENCOUNTER
Called and left message with p't daughter for pt to give us a call back to schedule appointment. Referral is scanned in pt's chart.

## 2020-10-28 ENCOUNTER — HOSPITAL ENCOUNTER (OUTPATIENT)
Dept: RADIOLOGY | Facility: HOSPITAL | Age: 75
Discharge: HOME | End: 2020-10-28
Attending: INTERNAL MEDICINE
Payer: MEDICARE

## 2020-10-28 DIAGNOSIS — I10 BENIGN ESSENTIAL HYPERTENSION: ICD-10-CM

## 2020-10-28 DIAGNOSIS — N18.31 STAGE 3A CHRONIC KIDNEY DISEASE: ICD-10-CM

## 2020-10-28 PROCEDURE — 76770 US RETROPERITONEAL COMPLETE: ICD-10-PCS | Mod: 26,,, | Performed by: RADIOLOGY

## 2020-10-28 PROCEDURE — 76770 US EXAM ABDO BACK WALL COMP: CPT | Mod: 26,,, | Performed by: RADIOLOGY

## 2020-10-28 PROCEDURE — 76770 US EXAM ABDO BACK WALL COMP: CPT | Mod: TC

## 2021-03-05 ENCOUNTER — TELEPHONE (OUTPATIENT)
Dept: NEPHROLOGY | Facility: CLINIC | Age: 76
End: 2021-03-05

## 2021-03-05 DIAGNOSIS — I10 HYPERTENSION, UNSPECIFIED TYPE: Primary | ICD-10-CM

## 2021-03-11 ENCOUNTER — OFFICE VISIT (OUTPATIENT)
Dept: NEPHROLOGY | Facility: CLINIC | Age: 76
End: 2021-03-11
Payer: MEDICARE

## 2021-03-11 VITALS
HEIGHT: 68 IN | WEIGHT: 286.5 LBS | BODY MASS INDEX: 43.42 KG/M2 | HEART RATE: 62 BPM | SYSTOLIC BLOOD PRESSURE: 148 MMHG | DIASTOLIC BLOOD PRESSURE: 77 MMHG

## 2021-03-11 DIAGNOSIS — E79.0 HYPERURICEMIA: ICD-10-CM

## 2021-03-11 DIAGNOSIS — N18.31 STAGE 3A CHRONIC KIDNEY DISEASE: Primary | ICD-10-CM

## 2021-03-11 DIAGNOSIS — I10 HTN (HYPERTENSION), BENIGN: ICD-10-CM

## 2021-03-11 PROCEDURE — 99999 PR PBB SHADOW E&M-EST. PATIENT-LVL III: ICD-10-PCS | Mod: PBBFAC,,, | Performed by: INTERNAL MEDICINE

## 2021-03-11 PROCEDURE — 1126F AMNT PAIN NOTED NONE PRSNT: CPT | Mod: S$GLB,,, | Performed by: INTERNAL MEDICINE

## 2021-03-11 PROCEDURE — 99203 PR OFFICE/OUTPT VISIT, NEW, LEVL III, 30-44 MIN: ICD-10-PCS | Mod: S$GLB,,, | Performed by: INTERNAL MEDICINE

## 2021-03-11 PROCEDURE — 3288F PR FALLS RISK ASSESSMENT DOCUMENTED: ICD-10-PCS | Mod: CPTII,S$GLB,, | Performed by: INTERNAL MEDICINE

## 2021-03-11 PROCEDURE — 99203 OFFICE O/P NEW LOW 30 MIN: CPT | Mod: S$GLB,,, | Performed by: INTERNAL MEDICINE

## 2021-03-11 PROCEDURE — 3078F PR MOST RECENT DIASTOLIC BLOOD PRESSURE < 80 MM HG: ICD-10-PCS | Mod: CPTII,S$GLB,, | Performed by: INTERNAL MEDICINE

## 2021-03-11 PROCEDURE — 1159F MED LIST DOCD IN RCRD: CPT | Mod: S$GLB,,, | Performed by: INTERNAL MEDICINE

## 2021-03-11 PROCEDURE — 99999 PR PBB SHADOW E&M-EST. PATIENT-LVL III: CPT | Mod: PBBFAC,,, | Performed by: INTERNAL MEDICINE

## 2021-03-11 PROCEDURE — 3077F PR MOST RECENT SYSTOLIC BLOOD PRESSURE >= 140 MM HG: ICD-10-PCS | Mod: CPTII,S$GLB,, | Performed by: INTERNAL MEDICINE

## 2021-03-11 PROCEDURE — 1101F PR PT FALLS ASSESS DOC 0-1 FALLS W/OUT INJ PAST YR: ICD-10-PCS | Mod: CPTII,S$GLB,, | Performed by: INTERNAL MEDICINE

## 2021-03-11 PROCEDURE — 3008F BODY MASS INDEX DOCD: CPT | Mod: CPTII,S$GLB,, | Performed by: INTERNAL MEDICINE

## 2021-03-11 PROCEDURE — 1126F PR PAIN SEVERITY QUANTIFIED, NO PAIN PRESENT: ICD-10-PCS | Mod: S$GLB,,, | Performed by: INTERNAL MEDICINE

## 2021-03-11 PROCEDURE — 3008F PR BODY MASS INDEX (BMI) DOCUMENTED: ICD-10-PCS | Mod: CPTII,S$GLB,, | Performed by: INTERNAL MEDICINE

## 2021-03-11 PROCEDURE — 3288F FALL RISK ASSESSMENT DOCD: CPT | Mod: CPTII,S$GLB,, | Performed by: INTERNAL MEDICINE

## 2021-03-11 PROCEDURE — 3077F SYST BP >= 140 MM HG: CPT | Mod: CPTII,S$GLB,, | Performed by: INTERNAL MEDICINE

## 2021-03-11 PROCEDURE — 1159F PR MEDICATION LIST DOCUMENTED IN MEDICAL RECORD: ICD-10-PCS | Mod: S$GLB,,, | Performed by: INTERNAL MEDICINE

## 2021-03-11 PROCEDURE — 1101F PT FALLS ASSESS-DOCD LE1/YR: CPT | Mod: CPTII,S$GLB,, | Performed by: INTERNAL MEDICINE

## 2021-03-11 PROCEDURE — 3078F DIAST BP <80 MM HG: CPT | Mod: CPTII,S$GLB,, | Performed by: INTERNAL MEDICINE

## 2021-03-20 ENCOUNTER — IMMUNIZATION (OUTPATIENT)
Dept: PRIMARY CARE CLINIC | Facility: CLINIC | Age: 76
End: 2021-03-20
Payer: MEDICARE

## 2021-03-20 DIAGNOSIS — Z23 NEED FOR VACCINATION: Primary | ICD-10-CM

## 2021-03-20 PROCEDURE — 0001A PR IMMUNIZ ADMIN, SARS-COV-2 COVID-19 VACC, 30MCG/0.3ML, 1ST DOSE: CPT | Mod: CV19,S$GLB,, | Performed by: INTERNAL MEDICINE

## 2021-03-20 PROCEDURE — 91300 PR SARS-COV- 2 COVID-19 VACCINE, NO PRSV, 30MCG/0.3ML, IM: ICD-10-PCS | Mod: S$GLB,,, | Performed by: INTERNAL MEDICINE

## 2021-03-20 PROCEDURE — 91300 PR SARS-COV- 2 COVID-19 VACCINE, NO PRSV, 30MCG/0.3ML, IM: CPT | Mod: S$GLB,,, | Performed by: INTERNAL MEDICINE

## 2021-03-20 PROCEDURE — 0001A PR IMMUNIZ ADMIN, SARS-COV-2 COVID-19 VACC, 30MCG/0.3ML, 1ST DOSE: ICD-10-PCS | Mod: CV19,S$GLB,, | Performed by: INTERNAL MEDICINE

## 2021-03-20 RX ADMIN — Medication 0.3 ML: at 08:03

## 2021-04-11 ENCOUNTER — IMMUNIZATION (OUTPATIENT)
Dept: PRIMARY CARE CLINIC | Facility: CLINIC | Age: 76
End: 2021-04-11
Payer: MEDICARE

## 2021-04-11 DIAGNOSIS — Z23 NEED FOR VACCINATION: Primary | ICD-10-CM

## 2021-04-11 PROCEDURE — 0002A PR IMMUNIZ ADMIN, SARS-COV-2 COVID-19 VACC, 30MCG/0.3ML, 2ND DOSE: CPT | Mod: CV19,S$GLB,, | Performed by: INTERNAL MEDICINE

## 2021-04-11 PROCEDURE — 0002A PR IMMUNIZ ADMIN, SARS-COV-2 COVID-19 VACC, 30MCG/0.3ML, 2ND DOSE: ICD-10-PCS | Mod: CV19,S$GLB,, | Performed by: INTERNAL MEDICINE

## 2021-04-11 PROCEDURE — 91300 PR SARS-COV- 2 COVID-19 VACCINE, NO PRSV, 30MCG/0.3ML, IM: ICD-10-PCS | Mod: S$GLB,,, | Performed by: INTERNAL MEDICINE

## 2021-04-11 PROCEDURE — 91300 PR SARS-COV- 2 COVID-19 VACCINE, NO PRSV, 30MCG/0.3ML, IM: CPT | Mod: S$GLB,,, | Performed by: INTERNAL MEDICINE

## 2021-04-11 RX ADMIN — Medication 0.3 ML: at 10:04

## 2021-05-02 ENCOUNTER — OFFICE VISIT (OUTPATIENT)
Dept: URGENT CARE | Facility: CLINIC | Age: 76
End: 2021-05-02
Payer: MEDICARE

## 2021-05-02 VITALS
BODY MASS INDEX: 43.35 KG/M2 | DIASTOLIC BLOOD PRESSURE: 78 MMHG | HEART RATE: 70 BPM | WEIGHT: 286 LBS | SYSTOLIC BLOOD PRESSURE: 154 MMHG | TEMPERATURE: 97 F | HEIGHT: 68 IN | RESPIRATION RATE: 14 BRPM | OXYGEN SATURATION: 97 %

## 2021-05-02 DIAGNOSIS — J32.9 SINUSITIS, UNSPECIFIED CHRONICITY, UNSPECIFIED LOCATION: Primary | ICD-10-CM

## 2021-05-02 DIAGNOSIS — S01.301A OPEN WOUND OF AURICLE OF RIGHT EAR, INITIAL ENCOUNTER: ICD-10-CM

## 2021-05-02 PROCEDURE — 99214 PR OFFICE/OUTPT VISIT, EST, LEVL IV, 30-39 MIN: ICD-10-PCS | Mod: S$GLB,,, | Performed by: PHYSICIAN ASSISTANT

## 2021-05-02 PROCEDURE — 3008F BODY MASS INDEX DOCD: CPT | Mod: CPTII,S$GLB,, | Performed by: PHYSICIAN ASSISTANT

## 2021-05-02 PROCEDURE — 3008F PR BODY MASS INDEX (BMI) DOCUMENTED: ICD-10-PCS | Mod: CPTII,S$GLB,, | Performed by: PHYSICIAN ASSISTANT

## 2021-05-02 PROCEDURE — 99214 OFFICE O/P EST MOD 30 MIN: CPT | Mod: S$GLB,,, | Performed by: PHYSICIAN ASSISTANT

## 2021-05-02 RX ORDER — MUPIROCIN 20 MG/G
OINTMENT TOPICAL
Qty: 22 G | Refills: 1 | Status: SHIPPED | OUTPATIENT
Start: 2021-05-02

## 2021-05-02 RX ORDER — AMOXICILLIN 875 MG/1
875 TABLET, FILM COATED ORAL 2 TIMES DAILY
Qty: 14 TABLET | Refills: 0 | Status: SHIPPED | OUTPATIENT
Start: 2021-05-02 | End: 2021-05-02

## 2021-05-02 RX ORDER — FLUTICASONE PROPIONATE 50 MCG
1 SPRAY, SUSPENSION (ML) NASAL DAILY
Qty: 15.8 ML | Refills: 0 | Status: SHIPPED | OUTPATIENT
Start: 2021-05-02 | End: 2021-06-01

## 2021-05-02 RX ORDER — AMOXICILLIN 500 MG/1
500 TABLET, FILM COATED ORAL EVERY 12 HOURS
Qty: 14 TABLET | Refills: 0 | Status: SHIPPED | OUTPATIENT
Start: 2021-05-02 | End: 2021-05-09

## 2021-06-07 ENCOUNTER — TELEPHONE (OUTPATIENT)
Dept: PULMONOLOGY | Facility: CLINIC | Age: 76
End: 2021-06-07

## 2021-06-07 DIAGNOSIS — N18.31 STAGE 3A CHRONIC KIDNEY DISEASE: ICD-10-CM

## 2021-07-08 LAB
ALBUMIN: 4.1 GRAM/DL (ref 3.5–5)
ALP SERPL-CCNC: 49 UNIT/L (ref 38–126)
ALT SERPL W P-5'-P-CCNC: 17 UNIT/L (ref 7–56)
ANION GAP SERPL CALC-SCNC: 15 MEQ/L (ref 9–18)
AST SERPL-CCNC: 21 UNIT/L (ref 7–40)
BASOPHILS ABSOLUTE COUNT: 0 K/UL (ref 0–0.2)
BASOPHILS NFR BLD: 0.5 % (ref 0–2)
BILIRUB SERPL-MCNC: 0.6 MG/DL (ref 0–1.2)
BUN BLD-MCNC: 30 MG/DL (ref 7–21)
BUN/CREAT SERPL: 30 RATIO (ref 6–22)
CALC OSMOLALITY: 291 MOSM/KG (ref 275–295)
CALCIUM SERPL-MCNC: 9.6 MG/DL (ref 8.5–10.3)
CHLORIDE SERPL-SCNC: 103 MEQ/L (ref 98–107)
CO2 SERPL-SCNC: 29 MEQ/L (ref 21–31)
CREAT SERPL-MCNC: 1 MG/DL (ref 0.5–1)
DIFFERENTIAL TYPE: NORMAL
EOSINOPHIL NFR BLD: 3 % (ref 0–4)
EOSINOPHILS ABSOLUTE COUNT: 0.1 K/UL (ref 0–0.7)
ERYTHROCYTE [DISTWIDTH] IN BLOOD BY AUTOMATED COUNT: 14.8 % (ref 12–15.3)
GFR: 51.9 ML/MIN/1.73M2
GLUCOSE SERPL-MCNC: 92 MG/DL (ref 70–100)
HCT VFR BLD AUTO: 37.6 % (ref 37–47)
HGB BLD-MCNC: 12.4 GRAM/DL (ref 12–16)
LYMPHOCYTES %: 19.3 % (ref 15–45)
LYMPHOCYTES ABSOLUTE COUNT: 1 K/UL (ref 1–4.2)
MAGNESIUM SERPL-MCNC: 2 MG/DL (ref 1.7–2.2)
MCH RBC QN AUTO: 29.6 PICOGRAM (ref 27–33)
MCHC RBC AUTO-ENTMCNC: 33.1 GRAM/DL (ref 32–36)
MCV RBC AUTO: 89.5 FEMTOLITER (ref 81–99)
MONOCYTES %: 5.9 % (ref 3–13)
MONOCYTES ABSOLUTE COUNT: 0.3 K/UL (ref 0.1–0.8)
NEUTROPHILS ABSOLUTE COUNT: 3.5 K/UL (ref 2.1–7.6)
NEUTROPHILS RELATIVE PERCENT: 71.3 % (ref 32–80)
PHOSPHATE FLD-MCNC: 3.4 MG/DL (ref 2.4–4.4)
PLATELET # BLD AUTO: 172 K/UL (ref 150–350)
PMV BLD AUTO: 9.9 FEMTOLITER (ref 7–10.2)
POTASSIUM SERPL-SCNC: 3.7 MEQ/L (ref 3.5–5)
RBC # BLD AUTO: 4.2 MIL/UL (ref 4.2–5.4)
SODIUM BLD-SCNC: 143 MEQ/L (ref 135–145)
TOTAL PROTEIN: 6.2 GRAM/DL (ref 6.3–8.2)
URATE SERPL-MCNC: 7.2 MG/DL (ref 2.5–7.5)
WBC # BLD AUTO: 4.9 K/UL (ref 4.5–11)

## 2021-07-09 LAB
APPEARANCE UR: CLEAR
BACTERIA #/AREA URNS HPF: NORMAL /HPF
BACTERIA UR CULT: NORMAL
BILIRUB UR QL STRIP: NEGATIVE
COLOR UR: YELLOW
GLUCOSE UR QL STRIP: NEGATIVE
HGB UR QL STRIP: NEGATIVE
HYALINE CASTS #/AREA URNS LPF: NORMAL /LPF
KETONES UR QL STRIP: NEGATIVE
LEUKOCYTE ESTERASE UR QL STRIP: NEGATIVE
NITRITE UR QL STRIP: NEGATIVE
PH UR STRIP: 6.5 [PH] (ref 5–8)
PROT UR QL STRIP: NEGATIVE
RBC #/AREA URNS HPF: NORMAL /HPF
SP GR UR STRIP: 1.02 (ref 1–1.03)
SQUAMOUS #/AREA URNS HPF: NORMAL /HPF
WBC #/AREA URNS HPF: NORMAL /HPF

## 2021-08-06 ENCOUNTER — TELEPHONE (OUTPATIENT)
Dept: ORTHOPEDICS | Facility: CLINIC | Age: 76
End: 2021-08-06

## 2021-10-28 ENCOUNTER — OFFICE VISIT (OUTPATIENT)
Dept: NEPHROLOGY | Facility: CLINIC | Age: 76
End: 2021-10-28
Payer: MEDICARE

## 2021-10-28 DIAGNOSIS — R30.0 DYSURIA: ICD-10-CM

## 2021-10-28 DIAGNOSIS — N18.31 STAGE 3A CHRONIC KIDNEY DISEASE: ICD-10-CM

## 2021-10-28 DIAGNOSIS — I10 ESSENTIAL HYPERTENSION: Primary | ICD-10-CM

## 2021-10-28 PROCEDURE — 99213 OFFICE O/P EST LOW 20 MIN: CPT | Mod: S$GLB,,, | Performed by: INTERNAL MEDICINE

## 2021-10-28 PROCEDURE — 3066F NEPHROPATHY DOC TX: CPT | Mod: CPTII,S$GLB,, | Performed by: INTERNAL MEDICINE

## 2021-10-28 PROCEDURE — 99213 PR OFFICE/OUTPT VISIT, EST, LEVL III, 20-29 MIN: ICD-10-PCS | Mod: S$GLB,,, | Performed by: INTERNAL MEDICINE

## 2021-10-28 PROCEDURE — 3066F PR DOCUMENTATION OF TREATMENT FOR NEPHROPATHY: ICD-10-PCS | Mod: CPTII,S$GLB,, | Performed by: INTERNAL MEDICINE

## 2021-10-28 RX ORDER — DOXYCYCLINE HYCLATE 100 MG
100 TABLET ORAL 2 TIMES DAILY
Qty: 14 TABLET | Refills: 1 | Status: SHIPPED | OUTPATIENT
Start: 2021-10-28 | End: 2021-11-04

## 2022-03-08 LAB
ALBUMIN: 4.2 G/DL (ref 3.5–5)
ALP SERPL-CCNC: 51 U/L (ref 38–126)
ALT SERPL W P-5'-P-CCNC: 15 U/L (ref 7–56)
ANION GAP SERPL CALC-SCNC: 14.8 MMOL/L (ref 9–18)
AST SERPL-CCNC: 22 U/L (ref 7–40)
BASOPHILS ABSOLUTE COUNT: 0 THOUSAND/UL (ref 0–0.2)
BASOPHILS NFR BLD: 0.4 % (ref 0–2)
BILIRUB SERPL-MCNC: 0.7 MG/DL (ref 0–1.2)
BUN BLD-MCNC: 27 MG/DL (ref 7–21)
BUN/CREAT SERPL: 23 (ref 6–22)
CALC OSMOLALITY: 288 MOSM/KG (ref 275–295)
CALCIUM SERPL-MCNC: 9.7 MG/DL (ref 8.5–10.3)
CHLORIDE SERPL-SCNC: 103 MMOL/L (ref 98–107)
CO2 SERPL-SCNC: 28 MMOL/L (ref 21–31)
CREAT SERPL-MCNC: 1.18 MG/DL (ref 0.5–1)
EGFR: 53 ML/MIN
EOSINOPHIL NFR BLD: 1.3 % (ref 0–4)
EOSINOPHILS ABSOLUTE COUNT: 0.1 THOUSAND/UL (ref 0–0.7)
ERYTHROCYTE [DISTWIDTH] IN BLOOD BY AUTOMATED COUNT: 15.2 % (ref 12–15.3)
GFR: 45 ML/MIN
GLUCOSE SERPL-MCNC: 99 MG/DL (ref 70–100)
HCT VFR BLD AUTO: 37.8 % (ref 37–47)
HGB BLD-MCNC: 12.3 GM/DL (ref 12–16)
LYMPHOCYTES %: 20.8 % (ref 15–45)
LYMPHOCYTES ABSOLUTE COUNT: 1 THOUSAND/UL (ref 1–4.2)
MAGNESIUM SERPL-MCNC: 2 MG/DL (ref 1.7–2.2)
MCH RBC QN AUTO: 29.6 PG (ref 27–33)
MCHC RBC AUTO-ENTMCNC: 32.5 G/DL (ref 32–36)
MCV RBC AUTO: 90.9 FL (ref 81–99)
MONOCYTES %: 6.5 % (ref 3–13)
MONOCYTES ABSOLUTE COUNT: 0.3 THOUSAND/UL (ref 0.1–0.8)
NEUTROPHILS ABSOLUTE COUNT: 3.4 THOUSAND/UL (ref 2.1–7.6)
NEUTROPHILS RELATIVE PERCENT: 71 % (ref 32–80)
PHOSPHATE FLD-MCNC: 3.3 MG/DL (ref 2.4–4.4)
PLATELET # BLD AUTO: 171 THOUSAND/UL (ref 150–350)
PMV BLD AUTO: 10.2 FL (ref 7–10.2)
POTASSIUM SERPL-SCNC: 3.8 MMOL/L (ref 3.5–5)
RBC # BLD AUTO: 4.16 MILLION/UL (ref 4.2–5.4)
SODIUM BLD-SCNC: 142 MMOL/L (ref 135–145)
TOTAL PROTEIN: 6.3 G/DL (ref 6.3–8.2)
WBC # BLD AUTO: 4.8 THOUSAND/UL (ref 4.5–11)

## 2022-03-09 ENCOUNTER — OFFICE VISIT (OUTPATIENT)
Dept: NEPHROLOGY | Facility: CLINIC | Age: 77
End: 2022-03-09
Payer: MEDICARE

## 2022-03-09 VITALS
RESPIRATION RATE: 18 BRPM | SYSTOLIC BLOOD PRESSURE: 154 MMHG | DIASTOLIC BLOOD PRESSURE: 61 MMHG | BODY MASS INDEX: 44.19 KG/M2 | HEART RATE: 57 BPM | HEIGHT: 68 IN | TEMPERATURE: 98 F | WEIGHT: 291.56 LBS

## 2022-03-09 DIAGNOSIS — I10 PRIMARY HYPERTENSION: ICD-10-CM

## 2022-03-09 DIAGNOSIS — N18.31 STAGE 3A CHRONIC KIDNEY DISEASE: Primary | ICD-10-CM

## 2022-03-09 DIAGNOSIS — R60.0 BILATERAL LOWER EXTREMITY EDEMA: ICD-10-CM

## 2022-03-09 DIAGNOSIS — E79.0 HYPERURICEMIA: ICD-10-CM

## 2022-03-09 PROCEDURE — 99213 PR OFFICE/OUTPT VISIT, EST, LEVL III, 20-29 MIN: ICD-10-PCS | Mod: S$GLB,,, | Performed by: INTERNAL MEDICINE

## 2022-03-09 PROCEDURE — 3288F FALL RISK ASSESSMENT DOCD: CPT | Mod: CPTII,S$GLB,, | Performed by: INTERNAL MEDICINE

## 2022-03-09 PROCEDURE — 3066F PR DOCUMENTATION OF TREATMENT FOR NEPHROPATHY: ICD-10-PCS | Mod: CPTII,S$GLB,, | Performed by: INTERNAL MEDICINE

## 2022-03-09 PROCEDURE — 3288F PR FALLS RISK ASSESSMENT DOCUMENTED: ICD-10-PCS | Mod: CPTII,S$GLB,, | Performed by: INTERNAL MEDICINE

## 2022-03-09 PROCEDURE — 99999 PR PBB SHADOW E&M-EST. PATIENT-LVL III: CPT | Mod: PBBFAC,,, | Performed by: INTERNAL MEDICINE

## 2022-03-09 PROCEDURE — 3008F PR BODY MASS INDEX (BMI) DOCUMENTED: ICD-10-PCS | Mod: CPTII,S$GLB,, | Performed by: INTERNAL MEDICINE

## 2022-03-09 PROCEDURE — 3077F SYST BP >= 140 MM HG: CPT | Mod: CPTII,S$GLB,, | Performed by: INTERNAL MEDICINE

## 2022-03-09 PROCEDURE — 99999 PR PBB SHADOW E&M-EST. PATIENT-LVL III: ICD-10-PCS | Mod: PBBFAC,,, | Performed by: INTERNAL MEDICINE

## 2022-03-09 PROCEDURE — 1126F AMNT PAIN NOTED NONE PRSNT: CPT | Mod: CPTII,S$GLB,, | Performed by: INTERNAL MEDICINE

## 2022-03-09 PROCEDURE — 3077F PR MOST RECENT SYSTOLIC BLOOD PRESSURE >= 140 MM HG: ICD-10-PCS | Mod: CPTII,S$GLB,, | Performed by: INTERNAL MEDICINE

## 2022-03-09 PROCEDURE — 3078F PR MOST RECENT DIASTOLIC BLOOD PRESSURE < 80 MM HG: ICD-10-PCS | Mod: CPTII,S$GLB,, | Performed by: INTERNAL MEDICINE

## 2022-03-09 PROCEDURE — 99213 OFFICE O/P EST LOW 20 MIN: CPT | Mod: S$GLB,,, | Performed by: INTERNAL MEDICINE

## 2022-03-09 PROCEDURE — 3066F NEPHROPATHY DOC TX: CPT | Mod: CPTII,S$GLB,, | Performed by: INTERNAL MEDICINE

## 2022-03-09 PROCEDURE — 1159F PR MEDICATION LIST DOCUMENTED IN MEDICAL RECORD: ICD-10-PCS | Mod: CPTII,S$GLB,, | Performed by: INTERNAL MEDICINE

## 2022-03-09 PROCEDURE — 1101F PT FALLS ASSESS-DOCD LE1/YR: CPT | Mod: CPTII,S$GLB,, | Performed by: INTERNAL MEDICINE

## 2022-03-09 PROCEDURE — 1159F MED LIST DOCD IN RCRD: CPT | Mod: CPTII,S$GLB,, | Performed by: INTERNAL MEDICINE

## 2022-03-09 PROCEDURE — 1126F PR PAIN SEVERITY QUANTIFIED, NO PAIN PRESENT: ICD-10-PCS | Mod: CPTII,S$GLB,, | Performed by: INTERNAL MEDICINE

## 2022-03-09 PROCEDURE — 3008F BODY MASS INDEX DOCD: CPT | Mod: CPTII,S$GLB,, | Performed by: INTERNAL MEDICINE

## 2022-03-09 PROCEDURE — 3078F DIAST BP <80 MM HG: CPT | Mod: CPTII,S$GLB,, | Performed by: INTERNAL MEDICINE

## 2022-03-09 PROCEDURE — 1101F PR PT FALLS ASSESS DOC 0-1 FALLS W/OUT INJ PAST YR: ICD-10-PCS | Mod: CPTII,S$GLB,, | Performed by: INTERNAL MEDICINE

## 2022-03-09 RX ORDER — FUROSEMIDE 20 MG/1
20 TABLET ORAL 2 TIMES DAILY
Qty: 60 TABLET | Refills: 11 | Status: SHIPPED | OUTPATIENT
Start: 2022-03-09 | End: 2023-06-08

## 2022-03-09 RX ORDER — SPIRONOLACTONE 25 MG/1
25 TABLET ORAL DAILY
Qty: 30 TABLET | Refills: 11 | Status: SHIPPED | OUTPATIENT
Start: 2022-03-09 | End: 2023-03-09

## 2022-03-09 NOTE — PROGRESS NOTES
Name: Lexii Gonzalez  Medical Record Number: [id number]  Date of Service: 2022  Note By: Red Altamirano,     [facility]    Reason for Consultation: CKD 3  Referring Provider: Dr. Castaneda     History of Present Illness:  Lexii Gonzalez is a 75 y.o. female with past medical history of CKD 3a who presents for Follow up.  She denies any CP, SOB, N/V. Denies COVID-19. No LOT, No LOS, or COREY. No foam, no blood in urine but sts she has discomfort and fullness over bladder with cramping. Sts urine has foul smell. Pt sts she had COVID vaccine Pfizer on 2021 and in 2021. No dysuria. Stays at home mostly.    Past Medical History:  Past Medical History:   Diagnosis Date    Disorder of kidney and ureter 2016    High cholesterol     Hypertension     Osteoarthritis        Past Surgical History:  Past Surgical History:   Procedure Laterality Date    APPENDECTOMY  1975     SECTION      x 3-, ,     CHOLECYSTECTOMY  1974    ELBOW SURGERY Right 2001    removal of lipoma    ENDOMETRIAL ABLATION  2002    INCISIONAL HERNIA REPAIR  1996    TUBAL LIGATION  1978       Family History:  History reviewed. No pertinent family history.    Social History:  Social History     Socioeconomic History    Marital status:    Tobacco Use    Smoking status: Never Smoker    Smokeless tobacco: Never Used   Substance and Sexual Activity    Alcohol use: Yes     Comment: maybe once a yr if that    Drug use: No       Home Medications:   (Not in a hospital admission)      Allergies:  Ditropan [oxybutynin chloride], Lisinopril, and Lotensin [benazepril]    Review of Systems:  10 point review of systems was conducted and was negative except as mentioned in the HPI.    Physical Exam:  Vitals:  Vitals:    22 1000   BP: (!) 154/61   Pulse: (!) 57   Resp: 18   Temp: 97.6 °F (36.4 °C)     [unfilled]      Exam    General: No acute distress, well groomed, alert and oriented x  3  HEENT: Normocephalic, atraumatic, EOM's intact bilaterally, external inspection of ears and nose normal, moist mucous membranes, no oral ulcerations/lesions  Neck: Supple, symmetrical, trachea midline, no thyromegaly, no JVD  Respiratory: Clear to auscultation bilaterally, respirations unlabored, no rales/rhonchi/wheezing  Cardiovacular: Regular rate and rhythm, S1, S2 normal, no murmurs, rubs or gallops  Gastrointestinal: Soft, non-tender, bowel sounds normal, no hepatosplenomegaly  Musculoskeletal: No knee or ankle joint tenderness or swelling.   Extremities: No clubbing or cyanosis of bilateral upper extremities; + 2-3 lower extremity pretibial edema bilaterally, radial pulses 2+ bilaterally, symmetric  Skin: warm and dry; no rash on exposed skin  Neurologic: CN grossly intact. No asterixis.    Labs:  A1C:      CBC:  Recent Labs   Lab 07/08/21  1035 03/08/22  0920   WBC 4.9 4.8   RBC 4.20 4.16 L   Hemoglobin 12.4 12.3   Hematocrit 37.6 37.8   Platelets 172 171   MCV 89.5 90.9   MCH 29.6 29.6   MCHC 33.1 32.5     CMP:  Recent Labs   Lab 03/08/22  0920   Glucose 99   Calcium 9.7   ALBUMIN 4.2   Total Protein 6.3   Sodium 142   Potassium 3.8   CO2 28   Chloride 103   BUN 27.0 H   Creatinine 1.18 H   Alkaline Phosphatase 51   ALT 15   AST 22   Total Bilirubin 0.7     LIPIDS:      TSH:      URINALYSIS:  Recent Labs   Lab Result Units 03/08/22  1138   Color, UA  YELLOW   Specific Gravity, UA  1.025   pH, UA  6.0   Protein, UA  NEGATIVE   Glucose, UA  NEGATIVE   Bacteria, UA /HPF FEW*   Nitrite, UA  NEGATIVE   Leukocytes, UA  TRACE*   Hyaline Casts, UA /LPF NONE SEEN        Lab Results   Component Value Date    WBC 4.8 03/08/2022    HGB 12.3 03/08/2022    HCT 37.8 03/08/2022     03/08/2022    K 3.8 03/08/2022     03/08/2022    CO2 28 03/08/2022    BUN 27.0 (H) 03/08/2022    CREATININE 1.18 (H) 03/08/2022    CALCIUM 9.7 03/08/2022    PHOS 3.3 03/08/2022    MG 2.0 03/08/2022    ALBUMIN 4.2 03/08/2022    AST  22 03/08/2022    ALT 15 03/08/2022       No results found for: EXTANC, EXTWBC, EXTSEGS, EXTPLATELETS, EXTHEMOGLOBI, EXTHEMATOCRI, EXTCREATININ, EXTSODIUM, EXTPOTASSIUM, EXTBUN, EXTCO2, EXTCALCIUM, EXTPHOSPHORU, EXTGLUCOSE, EXTALBUMIN, EXTAST, EXTALT, EXTBILITOTAL, EXTLIPASE, EXTAMYLASE    No results found for: EXTCYCLOSLVL, EXTSIROLIMUS, EXTTACROLVL, EXTPROTCRE, EXTPTHINTACT, EXTPROTEINUA, EXTWBCUA, EXTRBCUA    Imaging Studies: n/a  ?    Assessment/Plan:    1- CKD 3 - Renal function is stable with GFR of 58.4, BUN/Cr 30/1 . Non-oliguric. No proteinuria on UA.  Renal US without obstruction, mass, stones, hydro. (R) is 11.3 cm, (L) is 11.7     2. Edema - Pt is currently on HCTZ 25 mg but may take an extra tablet in the early afternoon.  Will D/C HCTZ and start Lasix 20 mg qd, Spironolactone.      3. Hypertension - Not well controlled with current meds.  Amlodipine 10 mg at HS, Doxazocin 8 mg, Metoprolol succinate 75 mg     4. Hyperuricemia - Allopurinol, ASx. UA level is 7.4 mg        RTC in 4 months.     Red Altamirano DO  [facility]

## 2022-03-10 LAB
APPEARANCE UR: ABNORMAL
BACTERIA #/AREA URNS HPF: ABNORMAL /HPF
BACTERIA UR CULT: ABNORMAL
BACTERIA UR CULT: NORMAL
BILIRUB UR QL STRIP: NEGATIVE
COLOR UR: YELLOW
GLUCOSE UR QL STRIP: NEGATIVE
HGB UR QL STRIP: NEGATIVE
HYALINE CASTS #/AREA URNS LPF: ABNORMAL /LPF
KETONES UR QL STRIP: NEGATIVE
LEUKOCYTE ESTERASE UR QL STRIP: ABNORMAL
NITRITE UR QL STRIP: NEGATIVE
PH UR STRIP: 6 [PH] (ref 5–8)
PROT UR QL STRIP: NEGATIVE
RBC #/AREA URNS HPF: ABNORMAL /HPF
SERVICE CMNT-IMP: ABNORMAL
SP GR UR STRIP: 1.02 (ref 1–1.03)
SQUAMOUS #/AREA URNS HPF: ABNORMAL /HPF
WBC #/AREA URNS HPF: ABNORMAL /HPF

## 2022-07-15 ENCOUNTER — TELEPHONE (OUTPATIENT)
Dept: NEPHROLOGY | Facility: CLINIC | Age: 77
End: 2022-07-15
Payer: MEDICARE

## 2022-07-15 NOTE — TELEPHONE ENCOUNTER
----- Message from Laly Gerber sent at 7/15/2022  8:09 AM CDT -----  Contact: [phone]/ Self  Type:  Needs Medical Advice    Who Called: Pt   Symptoms (please be specific): Head congestion and UTI   How long has patient had these symptoms:  Few days   Pharmacy name and phone #:  [facility] [address]   Would the patient rather a call back or a response via [facility]? Call back   Best Call Back Number: [phone]  Additional Information: Pt also wants to confirm lab work orders have been sent to [facility].

## 2022-07-21 ENCOUNTER — OFFICE VISIT (OUTPATIENT)
Dept: NEPHROLOGY | Facility: CLINIC | Age: 77
End: 2022-07-21
Payer: MEDICARE

## 2022-07-21 VITALS
HEART RATE: 90 BPM | DIASTOLIC BLOOD PRESSURE: 92 MMHG | WEIGHT: 293 LBS | SYSTOLIC BLOOD PRESSURE: 158 MMHG | HEIGHT: 68 IN | RESPIRATION RATE: 18 BRPM | BODY MASS INDEX: 44.41 KG/M2

## 2022-07-21 DIAGNOSIS — N18.31 STAGE 3A CHRONIC KIDNEY DISEASE: Primary | ICD-10-CM

## 2022-07-21 DIAGNOSIS — E78.5 HYPERLIPIDEMIA, UNSPECIFIED HYPERLIPIDEMIA TYPE: ICD-10-CM

## 2022-07-21 DIAGNOSIS — R60.0 BILATERAL LEG EDEMA: ICD-10-CM

## 2022-07-21 DIAGNOSIS — I10 HTN (HYPERTENSION), BENIGN: ICD-10-CM

## 2022-07-21 DIAGNOSIS — N39.0 UTI (URINARY TRACT INFECTION), UNCOMPLICATED: ICD-10-CM

## 2022-07-21 LAB
ALBUMIN: 3.7 G/DL (ref 3.5–5)
ALP SERPL-CCNC: 47 U/L (ref 38–126)
ALT SERPL W P-5'-P-CCNC: 22 U/L (ref 7–56)
ANION GAP SERPL CALC-SCNC: 13.5 MMOL/L (ref 9–18)
APPEARANCE UR: CLEAR
AST SERPL-CCNC: 29 U/L (ref 7–40)
BACTERIA #/AREA URNS HPF: ABNORMAL /HPF
BACTERIA UR CULT: ABNORMAL
BASOPHILS ABSOLUTE COUNT: 0 THOUSAND/UL (ref 0–0.2)
BASOPHILS NFR BLD: 0.4 % (ref 0–2)
BILIRUB SERPL-MCNC: 0.6 MG/DL (ref 0–1.2)
BILIRUB UR QL STRIP: NEGATIVE
BUN BLD-MCNC: 24 MG/DL (ref 7–21)
BUN/CREAT SERPL: 24 (ref 6–22)
CALC OSMOLALITY: 295 MOSM/KG (ref 275–295)
CALCIUM SERPL-MCNC: 9.4 MG/DL (ref 8.5–10.3)
CHLORIDE SERPL-SCNC: 106 MMOL/L (ref 98–107)
CO2 SERPL-SCNC: 30 MMOL/L (ref 21–31)
COLOR UR: YELLOW
CREAT SERPL-MCNC: 1.02 MG/DL (ref 0.5–1)
EGFR: 63 ML/MIN
EOSINOPHIL NFR BLD: 2.1 % (ref 0–4)
EOSINOPHILS ABSOLUTE COUNT: 0.1 THOUSAND/UL (ref 0–0.7)
ERYTHROCYTE [DISTWIDTH] IN BLOOD BY AUTOMATED COUNT: 15.2 % (ref 12–15.3)
GFR: 54 ML/MIN
GLUCOSE SERPL-MCNC: 98 MG/DL (ref 70–100)
GLUCOSE UR QL STRIP: NEGATIVE
HCT VFR BLD AUTO: 36.8 % (ref 37–47)
HGB BLD-MCNC: 11.9 GM/DL (ref 12–16)
HGB UR QL STRIP: NEGATIVE
HYALINE CASTS #/AREA URNS LPF: ABNORMAL /LPF
KETONES UR QL STRIP: NEGATIVE
LEUKOCYTE ESTERASE UR QL STRIP: ABNORMAL
LYMPHOCYTES %: 19 % (ref 15–45)
LYMPHOCYTES ABSOLUTE COUNT: 1 THOUSAND/UL (ref 1–4.2)
MAGNESIUM SERPL-MCNC: 2.1 MG/DL (ref 1.7–2.2)
MCH RBC QN AUTO: 29.6 PG (ref 27–33)
MCHC RBC AUTO-ENTMCNC: 32.2 G/DL (ref 32–36)
MCV RBC AUTO: 91.8 FL (ref 81–99)
MONOCYTES %: 5.4 % (ref 3–13)
MONOCYTES ABSOLUTE COUNT: 0.3 THOUSAND/UL (ref 0.1–0.8)
NEUTROPHILS ABSOLUTE COUNT: 3.7 THOUSAND/UL (ref 2.1–7.6)
NEUTROPHILS RELATIVE PERCENT: 73.1 % (ref 32–80)
NITRITE UR QL STRIP: POSITIVE
PH UR STRIP: 6.5 [PH] (ref 5–8)
PHOSPHATE FLD-MCNC: 2.6 MG/DL (ref 2.4–4.4)
PLATELET # BLD AUTO: 151 THOUSAND/UL (ref 150–350)
PMV BLD AUTO: 9.8 FL (ref 7–10.2)
POTASSIUM SERPL-SCNC: 3.5 MMOL/L (ref 3.5–5)
PROT UR QL STRIP: NEGATIVE
RBC # BLD AUTO: 4.01 MILLION/UL (ref 4.2–5.4)
RBC #/AREA URNS HPF: ABNORMAL /HPF
SERVICE CMNT-IMP: ABNORMAL
SODIUM BLD-SCNC: 146 MMOL/L (ref 135–145)
SP GR UR STRIP: 1.01 (ref 1–1.03)
SQUAMOUS #/AREA URNS HPF: ABNORMAL /HPF
TOTAL PROTEIN: 5.6 G/DL (ref 6.3–8.2)
WBC # BLD AUTO: 5 THOUSAND/UL (ref 4.5–11)
WBC #/AREA URNS HPF: ABNORMAL /HPF

## 2022-07-21 PROCEDURE — 3008F PR BODY MASS INDEX (BMI) DOCUMENTED: ICD-10-PCS | Mod: CPTII,S$GLB,, | Performed by: INTERNAL MEDICINE

## 2022-07-21 PROCEDURE — 99213 OFFICE O/P EST LOW 20 MIN: CPT | Mod: S$GLB,,, | Performed by: INTERNAL MEDICINE

## 2022-07-21 PROCEDURE — 3288F FALL RISK ASSESSMENT DOCD: CPT | Mod: CPTII,S$GLB,, | Performed by: INTERNAL MEDICINE

## 2022-07-21 PROCEDURE — 3077F SYST BP >= 140 MM HG: CPT | Mod: CPTII,S$GLB,, | Performed by: INTERNAL MEDICINE

## 2022-07-21 PROCEDURE — 3066F NEPHROPATHY DOC TX: CPT | Mod: CPTII,S$GLB,, | Performed by: INTERNAL MEDICINE

## 2022-07-21 PROCEDURE — 1125F AMNT PAIN NOTED PAIN PRSNT: CPT | Mod: CPTII,S$GLB,, | Performed by: INTERNAL MEDICINE

## 2022-07-21 PROCEDURE — 1159F MED LIST DOCD IN RCRD: CPT | Mod: CPTII,S$GLB,, | Performed by: INTERNAL MEDICINE

## 2022-07-21 PROCEDURE — 1101F PT FALLS ASSESS-DOCD LE1/YR: CPT | Mod: CPTII,S$GLB,, | Performed by: INTERNAL MEDICINE

## 2022-07-21 PROCEDURE — 1101F PR PT FALLS ASSESS DOC 0-1 FALLS W/OUT INJ PAST YR: ICD-10-PCS | Mod: CPTII,S$GLB,, | Performed by: INTERNAL MEDICINE

## 2022-07-21 PROCEDURE — 99213 PR OFFICE/OUTPT VISIT, EST, LEVL III, 20-29 MIN: ICD-10-PCS | Mod: S$GLB,,, | Performed by: INTERNAL MEDICINE

## 2022-07-21 PROCEDURE — 99999 PR PBB SHADOW E&M-EST. PATIENT-LVL III: ICD-10-PCS | Mod: PBBFAC,,, | Performed by: INTERNAL MEDICINE

## 2022-07-21 PROCEDURE — 3077F PR MOST RECENT SYSTOLIC BLOOD PRESSURE >= 140 MM HG: ICD-10-PCS | Mod: CPTII,S$GLB,, | Performed by: INTERNAL MEDICINE

## 2022-07-21 PROCEDURE — 3080F PR MOST RECENT DIASTOLIC BLOOD PRESSURE >= 90 MM HG: ICD-10-PCS | Mod: CPTII,S$GLB,, | Performed by: INTERNAL MEDICINE

## 2022-07-21 PROCEDURE — 1125F PR PAIN SEVERITY QUANTIFIED, PAIN PRESENT: ICD-10-PCS | Mod: CPTII,S$GLB,, | Performed by: INTERNAL MEDICINE

## 2022-07-21 PROCEDURE — 1159F PR MEDICATION LIST DOCUMENTED IN MEDICAL RECORD: ICD-10-PCS | Mod: CPTII,S$GLB,, | Performed by: INTERNAL MEDICINE

## 2022-07-21 PROCEDURE — 3288F PR FALLS RISK ASSESSMENT DOCUMENTED: ICD-10-PCS | Mod: CPTII,S$GLB,, | Performed by: INTERNAL MEDICINE

## 2022-07-21 PROCEDURE — 3080F DIAST BP >= 90 MM HG: CPT | Mod: CPTII,S$GLB,, | Performed by: INTERNAL MEDICINE

## 2022-07-21 PROCEDURE — 3008F BODY MASS INDEX DOCD: CPT | Mod: CPTII,S$GLB,, | Performed by: INTERNAL MEDICINE

## 2022-07-21 PROCEDURE — 99999 PR PBB SHADOW E&M-EST. PATIENT-LVL III: CPT | Mod: PBBFAC,,, | Performed by: INTERNAL MEDICINE

## 2022-07-21 PROCEDURE — 3066F PR DOCUMENTATION OF TREATMENT FOR NEPHROPATHY: ICD-10-PCS | Mod: CPTII,S$GLB,, | Performed by: INTERNAL MEDICINE

## 2022-07-21 RX ORDER — LEVOFLOXACIN 500 MG/1
500 TABLET, FILM COATED ORAL DAILY
Qty: 10 TABLET | Refills: 1 | Status: SHIPPED | OUTPATIENT
Start: 2022-07-21 | End: 2023-05-25 | Stop reason: SDUPTHER

## 2022-07-21 NOTE — PROGRESS NOTES
Name: Lexii Gonzalez  Medical Record Number: [id number]  Date of Service: 2022  Note By: Red Altamirano,     [facility]    Reason for Consultation: CKD 3  Referring Provider: Dr. Castaneda     History of Present Illness:  Lexii Gonzalez is a 75 y.o. female with past medical history of CKD 3a who presents for Follow up.  She denies any CP, SOB, N/V. Having issues with sinus.  Denies COVID-19. No LOT, No LOS, or COREY. No foam, no blood in urine but sts she has discomfort and fullness over bladder with cramping. Sts urine has foul smell. Pt sts she had COVID vaccine Pfizer on 2021 and in 2021. No dysuria. Stays at home mostly.    Past Medical History:  Past Medical History:   Diagnosis Date    Disorder of kidney and ureter 2016    High cholesterol     Hypertension     Osteoarthritis        Past Surgical History:  Past Surgical History:   Procedure Laterality Date    APPENDECTOMY  1975     SECTION      x 3-, ,     CHOLECYSTECTOMY  1974    ELBOW SURGERY Right 2001    removal of lipoma    ENDOMETRIAL ABLATION  2002    INCISIONAL HERNIA REPAIR  1996    TUBAL LIGATION  1978       Family History:  History reviewed. No pertinent family history.    Social History:  Social History     Socioeconomic History    Marital status:    Tobacco Use    Smoking status: Never Smoker    Smokeless tobacco: Never Used   Substance and Sexual Activity    Alcohol use: Yes     Comment: maybe once a yr if that    Drug use: No       Home Medications:   (Not in a hospital admission)      Allergies:  Ditropan [oxybutynin chloride], Lisinopril, and Lotensin [benazepril]    Review of Systems:  10 point review of systems was conducted and was negative except as mentioned in the HPI.    Physical Exam:  Vitals:  Vitals:    22 1521   BP: (!) 158/92   Pulse: 90   Resp: 18     [unfilled]      Exam  General: No acute distress, well groomed, alert and oriented x  3  HEENT: Normocephalic, atraumatic, EOM's intact bilaterally, external inspection of ears and nose normal, moist mucous membranes, no oral ulcerations/lesions  Neck: Supple, symmetrical, trachea midline, no thyromegaly, no JVD  Respiratory: Clear to auscultation bilaterally, respirations unlabored, no rales/rhonchi/wheezing  Cardiovacular: Regular rate and rhythm, S1, S2 normal, no murmurs, rubs or gallops  Gastrointestinal: Soft, non-tender, bowel sounds normal, no hepatosplenomegaly  Musculoskeletal: No knee or ankle joint tenderness or swelling.   Extremities: No clubbing or cyanosis of bilateral upper extremities; 2+ lower extremity edema bilaterally, radial pulses 2+ bilaterally, symmetric, erythematous rash over shin area  Skin: warm and dry; no rash on exposed skin  Neurologic: CN grossly intact, no asterixis    Labs:  A1C:      CBC:  Recent Labs   Lab 07/08/21  1035 03/08/22  0920 07/18/22  1136   WBC 4.9 4.8 5.0   RBC 4.20 4.16 L 4.01 L   Hemoglobin 12.4 12.3 11.9 L   Hematocrit 37.6 37.8 36.8 L   Platelets 172 171 151   MCV 89.5 90.9 91.8   MCH 29.6 29.6 29.6   MCHC 33.1 32.5 32.2     CMP:  Recent Labs   Lab 07/18/22  1136   Glucose 98   Calcium 9.4   ALBUMIN 3.7   Total Protein 5.6 L   Sodium 146 H   Potassium 3.5   CO2 30   Chloride 106   BUN 24.0 H   Creatinine 1.02 H   Alkaline Phosphatase 47   ALT 22   AST 29   Total Bilirubin 0.6     LIPIDS:      TSH:      URINALYSIS:  Recent Labs   Lab Result Units 07/18/22  1136   Color, UA  YELLOW   Specific Gravity, UA  1.013   pH, UA  6.5   Protein, UA  NEGATIVE   Glucose, UA  NEGATIVE   Bacteria, UA /HPF MANY*   Nitrite, UA  POSITIVE*   Leukocytes, UA  3+*   Hyaline Casts, UA /LPF 0-5*        Lab Results   Component Value Date    WBC 5.0 07/18/2022    HGB 11.9 (L) 07/18/2022    HCT 36.8 (L) 07/18/2022     (H) 07/18/2022    K 3.5 07/18/2022     07/18/2022    CO2 30 07/18/2022    BUN 24.0 (H) 07/18/2022    CREATININE 1.02 (H) 07/18/2022    CALCIUM  9.4 07/18/2022    PHOS 2.6 07/18/2022    MG 2.1 07/18/2022    ALBUMIN 3.7 07/18/2022    AST 29 07/18/2022    ALT 22 07/18/2022       No results found for: EXTANC, EXTWBC, EXTSEGS, EXTPLATELETS, EXTHEMOGLOBI, EXTHEMATOCRI, EXTCREATININ, EXTSODIUM, EXTPOTASSIUM, EXTBUN, EXTCO2, EXTCALCIUM, EXTPHOSPHORU, EXTGLUCOSE, EXTALBUMIN, EXTAST, EXTALT, EXTBILITOTAL, EXTLIPASE, EXTAMYLASE    No results found for: EXTCYCLOSLVL, EXTSIROLIMUS, EXTTACROLVL, EXTPROTCRE, EXTPTHINTACT, EXTPROTEINUA, EXTWBCUA, EXTRBCUA    Imaging Studies: n/a  ?  Assessment/Plan:    1- CKD 3 - Renal function is stable with GFR of 54, BUN/Cr 30/1 . Non-oliguric. No proteinuria on UA.  Renal US without obstruction, mass, stones, hydro. (R) is 11.3 cm, (L) is 11.7.  Drink more water.     2. Edema - Pt is currently on HCTZ 25 mg but may take an extra tablet in the early afternoon.  Will D/C HCTZ and start Lasix 20 mg qd, Spironolactone.      3. Hypertension - Not well controlled with current meds.  Amlodipine 10 mg at HS, Doxazocin 8 mg, Metoprolol succinate 75 mg     4. Hyperuricemia - Allopurinol, ASx. UA level is 7.4 mg    5. UTI - d/t Klebsiella sensitive to Cipro/Levo        RTC in 4 months.     Red Altamirano,   [facility]

## 2022-12-01 LAB
ALBUMIN: 4.3 G/DL (ref 3.5–5)
ALP SERPL-CCNC: 49 U/L (ref 38–126)
ALT SERPL W P-5'-P-CCNC: 17 U/L (ref 7–56)
ANION GAP SERPL CALC-SCNC: 13.9 MMOL/L (ref 9–18)
APPEARANCE UR: CLEAR
AST SERPL-CCNC: 27 U/L (ref 7–40)
BACTERIA #/AREA URNS HPF: ABNORMAL /HPF
BACTERIA UR CULT: NORMAL
BILIRUB SERPL-MCNC: 0.6 MG/DL (ref 0–1.2)
BILIRUB UR QL STRIP: NEGATIVE
BUN BLD-MCNC: 30 MG/DL (ref 7–21)
BUN/CREAT SERPL: 22 (ref 6–22)
CALC OSMOLALITY: 285 MOSM/KG (ref 275–295)
CALCIUM SERPL-MCNC: 9.7 MG/DL (ref 8.5–10.3)
CHLORIDE SERPL-SCNC: 101 MMOL/L (ref 98–107)
CO2 SERPL-SCNC: 29 MMOL/L (ref 21–31)
COLOR UR: YELLOW
CREAT SERPL-MCNC: 1.34 MG/DL (ref 0.5–1)
EGFR: 41 ML/MIN
GLUCOSE SERPL-MCNC: 90 MG/DL (ref 70–100)
GLUCOSE UR QL STRIP: NEGATIVE
HGB UR QL STRIP: NEGATIVE
HYALINE CASTS #/AREA URNS LPF: ABNORMAL /LPF
KETONES UR QL STRIP: NEGATIVE
LEUKOCYTE ESTERASE UR QL STRIP: ABNORMAL
MAGNESIUM SERPL-MCNC: 2.1 MG/DL (ref 1.7–2.2)
NITRITE UR QL STRIP: NEGATIVE
PH UR STRIP: 7.5 [PH] (ref 5–8)
PHOSPHATE FLD-MCNC: 2.8 MG/DL (ref 2.4–4.4)
POTASSIUM SERPL-SCNC: 3.9 MMOL/L (ref 3.5–5)
PROT UR QL STRIP: NEGATIVE
RBC #/AREA URNS HPF: ABNORMAL /HPF
SERVICE CMNT-IMP: ABNORMAL
SODIUM BLD-SCNC: 140 MMOL/L (ref 135–145)
SP GR UR STRIP: 1.01 (ref 1–1.03)
SQUAMOUS #/AREA URNS HPF: ABNORMAL /HPF
TOTAL PROTEIN: 6.4 G/DL (ref 6.3–8.2)
WBC #/AREA URNS HPF: ABNORMAL /HPF

## 2022-12-08 ENCOUNTER — OFFICE VISIT (OUTPATIENT)
Dept: NEPHROLOGY | Facility: CLINIC | Age: 77
End: 2022-12-08
Payer: MEDICARE

## 2022-12-08 VITALS
TEMPERATURE: 97 F | WEIGHT: 291.56 LBS | DIASTOLIC BLOOD PRESSURE: 72 MMHG | SYSTOLIC BLOOD PRESSURE: 151 MMHG | BODY MASS INDEX: 44.33 KG/M2 | HEART RATE: 75 BPM

## 2022-12-08 DIAGNOSIS — N18.31 STAGE 3A CHRONIC KIDNEY DISEASE: Primary | ICD-10-CM

## 2022-12-08 DIAGNOSIS — N18.31 ANEMIA OF CHRONIC RENAL FAILURE, STAGE 3A: ICD-10-CM

## 2022-12-08 DIAGNOSIS — D63.1 ANEMIA OF CHRONIC RENAL FAILURE, STAGE 3A: ICD-10-CM

## 2022-12-08 DIAGNOSIS — E79.0 HYPERURICEMIA: ICD-10-CM

## 2022-12-08 DIAGNOSIS — I10 PRIMARY HYPERTENSION: ICD-10-CM

## 2022-12-08 PROCEDURE — 1101F PT FALLS ASSESS-DOCD LE1/YR: CPT | Mod: CPTII,S$GLB,, | Performed by: INTERNAL MEDICINE

## 2022-12-08 PROCEDURE — 99214 PR OFFICE/OUTPT VISIT, EST, LEVL IV, 30-39 MIN: ICD-10-PCS | Mod: S$GLB,,, | Performed by: INTERNAL MEDICINE

## 2022-12-08 PROCEDURE — 1159F MED LIST DOCD IN RCRD: CPT | Mod: CPTII,S$GLB,, | Performed by: INTERNAL MEDICINE

## 2022-12-08 PROCEDURE — 1126F AMNT PAIN NOTED NONE PRSNT: CPT | Mod: CPTII,S$GLB,, | Performed by: INTERNAL MEDICINE

## 2022-12-08 PROCEDURE — 99999 PR PBB SHADOW E&M-EST. PATIENT-LVL III: CPT | Mod: PBBFAC,,, | Performed by: INTERNAL MEDICINE

## 2022-12-08 PROCEDURE — 99214 OFFICE O/P EST MOD 30 MIN: CPT | Mod: S$GLB,,, | Performed by: INTERNAL MEDICINE

## 2022-12-08 PROCEDURE — 3288F PR FALLS RISK ASSESSMENT DOCUMENTED: ICD-10-PCS | Mod: CPTII,S$GLB,, | Performed by: INTERNAL MEDICINE

## 2022-12-08 PROCEDURE — 3077F PR MOST RECENT SYSTOLIC BLOOD PRESSURE >= 140 MM HG: ICD-10-PCS | Mod: CPTII,S$GLB,, | Performed by: INTERNAL MEDICINE

## 2022-12-08 PROCEDURE — 3077F SYST BP >= 140 MM HG: CPT | Mod: CPTII,S$GLB,, | Performed by: INTERNAL MEDICINE

## 2022-12-08 PROCEDURE — 1159F PR MEDICATION LIST DOCUMENTED IN MEDICAL RECORD: ICD-10-PCS | Mod: CPTII,S$GLB,, | Performed by: INTERNAL MEDICINE

## 2022-12-08 PROCEDURE — 1101F PR PT FALLS ASSESS DOC 0-1 FALLS W/OUT INJ PAST YR: ICD-10-PCS | Mod: CPTII,S$GLB,, | Performed by: INTERNAL MEDICINE

## 2022-12-08 PROCEDURE — 3078F DIAST BP <80 MM HG: CPT | Mod: CPTII,S$GLB,, | Performed by: INTERNAL MEDICINE

## 2022-12-08 PROCEDURE — 1126F PR PAIN SEVERITY QUANTIFIED, NO PAIN PRESENT: ICD-10-PCS | Mod: CPTII,S$GLB,, | Performed by: INTERNAL MEDICINE

## 2022-12-08 PROCEDURE — 3078F PR MOST RECENT DIASTOLIC BLOOD PRESSURE < 80 MM HG: ICD-10-PCS | Mod: CPTII,S$GLB,, | Performed by: INTERNAL MEDICINE

## 2022-12-08 PROCEDURE — 3066F PR DOCUMENTATION OF TREATMENT FOR NEPHROPATHY: ICD-10-PCS | Mod: CPTII,S$GLB,, | Performed by: INTERNAL MEDICINE

## 2022-12-08 PROCEDURE — 3066F NEPHROPATHY DOC TX: CPT | Mod: CPTII,S$GLB,, | Performed by: INTERNAL MEDICINE

## 2022-12-08 PROCEDURE — 99999 PR PBB SHADOW E&M-EST. PATIENT-LVL III: ICD-10-PCS | Mod: PBBFAC,,, | Performed by: INTERNAL MEDICINE

## 2022-12-08 PROCEDURE — 3288F FALL RISK ASSESSMENT DOCD: CPT | Mod: CPTII,S$GLB,, | Performed by: INTERNAL MEDICINE

## 2022-12-08 RX ORDER — FLUCONAZOLE 150 MG/1
150 TABLET ORAL DAILY
Qty: 3 TABLET | Refills: 0 | Status: SHIPPED | OUTPATIENT
Start: 2022-12-08 | End: 2022-12-09

## 2022-12-08 RX ORDER — DOXAZOSIN 8 MG/1
8 TABLET ORAL EVERY 12 HOURS
Qty: 180 TABLET | Refills: 3 | Status: SHIPPED | OUTPATIENT
Start: 2022-12-08 | End: 2023-12-08

## 2022-12-08 NOTE — PROGRESS NOTES
Name: Lexii Gonzalez  Medical Record Number: [id number]  Date of Service: 2022  Note By: Red Altamirano DO    [facility]  Reason for Consultation: CKD 3  Referring Provider: Dr. Castaneda     History of Present Illness:  Lexii Gonzalez is a 75 y.o. female with past medical history of CKD 3a who presents for Follow up.  She denies any CP, SOB, N/V. Pt sts she was recently tx for UTI with 7 days of Bactrim. Having issues with sinus.  Denies COVID-19. No LOT, No LOS, or COREY. No foam, no blood in urine but sts she has discomfort and fullness over bladder with cramping. Sts urine has foul smell. Pt sts she had COVID vaccine Pfizer on 2021 and in 2021. No dysuria at this time but has a slight discharche. Stays at home mostly.    History of CKD3a  Nephrologist Dr. Altamirano  Access n/a?  Dialysis center n/a?    Past Medical History:  Past Medical History:   Diagnosis Date    Disorder of kidney and ureter 2016    High cholesterol     Hypertension     Osteoarthritis        Past Surgical History:  Past Surgical History:   Procedure Laterality Date    APPENDECTOMY  1975     SECTION      x 3-, ,     CHOLECYSTECTOMY  1974    ELBOW SURGERY Right 2001    removal of lipoma    ENDOMETRIAL ABLATION  2002    INCISIONAL HERNIA REPAIR  1996    TUBAL LIGATION  1978       Family History:  History reviewed. No pertinent family history.    Social History:  Social History     Socioeconomic History    Marital status:    Tobacco Use    Smoking status: Never    Smokeless tobacco: Never   Substance and Sexual Activity    Alcohol use: Yes     Comment: maybe once a yr if that    Drug use: No       Home Medications:   (Not in a hospital admission)      Allergies:  Ditropan [oxybutynin chloride], Lisinopril, and Lotensin [benazepril]    Review of Systems:  10 point review of systems was conducted and was negative except as mentioned in the HPI.    Physical Exam:  Vitals:  Vitals:     12/08/22 1545   BP: (!) 151/72   Pulse: 75   Temp: 97.1 °F (36.2 °C)     [unfilled]      Exam  General: No acute distress, well groomed, alert and oriented x 3  HEENT: Normocephalic, atraumatic, EOM's intact bilaterally, external inspection of ears and nose normal, moist mucous membranes, no oral ulcerations/lesions  Neck: Supple, symmetrical, trachea midline, no thyromegaly, no JVD  Respiratory: Clear to auscultation bilaterally, respirations unlabored, no rales/rhonchi/wheezing  Cardiovacular: Regular rate and rhythm, S1, S2 normal, no murmurs, rubs or gallops  Gastrointestinal: Soft, non-tender, bowel sounds normal, no hepatosplenomegaly  Musculoskeletal: No knee or ankle joint tenderness or swelling.   Extremities: No clubbing or cyanosis of bilateral upper extremities; 2 + lower extremity edema bilaterally (sts went to play at the [employer] yesterday), radial pulses 2+ bilaterally, symmetric  Skin: warm and dry; no rash on exposed skin  Neurologic: CN grossly intact, no asterixis.    Labs:  A1C:      CBC:  Recent Labs   Lab 07/08/21  1035 03/08/22  0920 07/18/22  1136   WBC 4.9 4.8 5.0   RBC 4.20 4.16 L 4.01 L   Hemoglobin 12.4 12.3 11.9 L   Hematocrit 37.6 37.8 36.8 L   Platelets 172 171 151   MCV 89.5 90.9 91.8   MCH 29.6 29.6 29.6   MCHC 33.1 32.5 32.2     CMP:  Recent Labs   Lab 11/29/22  1028   Glucose 90   Calcium 9.7   ALBUMIN 4.3   Total Protein 6.4   Sodium 140   Potassium 3.9   CO2 29   Chloride 101   BUN 30.0 H   Creatinine 1.34 H   Alkaline Phosphatase 49   ALT 17   AST 27   Total Bilirubin 0.6     LIPIDS:      TSH:      URINALYSIS:  Recent Labs   Lab Result Units 11/29/22  1028   Color, UA  YELLOW   Specific Gravity, UA  1.013   pH, UA  7.5   Protein, UA  NEGATIVE   Glucose, UA  NEGATIVE   Bacteria, UA /HPF NONE SEEN   Nitrite, UA  NEGATIVE   Leukocytes, UA  1+*   Hyaline Casts, UA /LPF NONE SEEN        Lab Results   Component Value Date    WBC 5.0 07/18/2022    HGB 11.9 (L) 07/18/2022    HCT 36.8 (L)  07/18/2022     11/29/2022    K 3.9 11/29/2022     11/29/2022    CO2 29 11/29/2022    BUN 30.0 (H) 11/29/2022    CREATININE 1.34 (H) 11/29/2022    CALCIUM 9.7 11/29/2022    PHOS 2.8 11/29/2022    MG 2.1 11/29/2022    ALBUMIN 4.3 11/29/2022    AST 27 11/29/2022    ALT 17 11/29/2022       No results found for: EXTANC, EXTWBC, EXTSEGS, EXTPLATELETS, EXTHEMOGLOBI, EXTHEMATOCRI, EXTCREATININ, EXTSODIUM, EXTPOTASSIUM, EXTBUN, EXTCO2, EXTCALCIUM, EXTPHOSPHORU, EXTGLUCOSE, EXTALBUMIN, EXTAST, EXTALT, EXTBILITOTAL, EXTLIPASE, EXTAMYLASE    No results found for: EXTCYCLOSLVL, EXTSIROLIMUS, EXTTACROLVL, EXTPROTCRE, EXTPTHINTACT, EXTPROTEINUA, EXTWBCUA, EXTRBCUA    Imaging Studies: n/a  ?    Assessment/Plan:  77 y.o. female with:      1- CKD 3b - Renal function is worse with GFR of 54 --> 41, BUN/Cr 30/1 --> 30/1.34, likely d/t treatment with Bactrim for a UTI.  Non-oliguric.   Renal US without obstruction, mass, stones, hydro. (R) is 11.3 cm, (L) is 11.7.  Drink more water --> Na is finally 140!.     2. Edema - Pt is currently on HCTZ 25 mg but may take an extra tablet in the early afternoon.  Will D/C HCTZ and start Lasix 20 mg bid, Spironolactone.      3. Hypertension - Not well controlled with current meds.  Amlodipine 10 mg at HS, Doxazocin 8 mg, Metoprolol succinate 75 mg     4. Hyperuricemia - Allopurinol, ASx. UA level is 7.4 mg     5. UTI - Diflucan s/p Bactrim.        RTC in 4 months.                    Red Altamirano, DO  [facility]

## 2023-03-30 RX ORDER — SOLIFENACIN SUCCINATE 10 MG/1
10 TABLET, FILM COATED ORAL DAILY
Qty: 90 TABLET | Refills: 3 | Status: SHIPPED | OUTPATIENT
Start: 2023-03-30 | End: 2024-03-29

## 2023-05-24 ENCOUNTER — TELEPHONE (OUTPATIENT)
Dept: NEPHROLOGY | Facility: CLINIC | Age: 78
End: 2023-05-24
Payer: MEDICARE

## 2023-05-24 NOTE — TELEPHONE ENCOUNTER
----- Message from Grace Brown sent at 5/24/2023 10:40 AM CDT -----  Regarding: labs/[facility]  Contact: [phone]  Patient is requesting a call back regarding her lab orders need to be sent to [facility]. She is at [facility] waiting.   Would the patient rather a call back or a response via [facility]?  Call   Best Call Back Number:  [phone]  Additional Information:

## 2023-05-24 NOTE — TELEPHONE ENCOUNTER
----- Message from Doris Doyle sent at 5/24/2023  1:23 PM CDT -----  Regarding: call back  Contact: [phone]  Type:  Patient Returning Call    Who Called: PT   Who Left Message for Patient: Nurse   Does the patient know what this is regarding?: Yes   Would the patient rather a call back or a response via [facility]? Call back   Best Call Back Number: [phone]  Additional Information:

## 2023-05-25 ENCOUNTER — TELEPHONE (OUTPATIENT)
Dept: NEPHROLOGY | Facility: CLINIC | Age: 78
End: 2023-05-25
Payer: MEDICARE

## 2023-05-25 DIAGNOSIS — E78.5 HYPERLIPIDEMIA, UNSPECIFIED HYPERLIPIDEMIA TYPE: ICD-10-CM

## 2023-05-25 DIAGNOSIS — I10 HTN (HYPERTENSION), BENIGN: ICD-10-CM

## 2023-05-25 DIAGNOSIS — N18.31 STAGE 3A CHRONIC KIDNEY DISEASE: ICD-10-CM

## 2023-05-25 DIAGNOSIS — N39.0 UTI (URINARY TRACT INFECTION), UNCOMPLICATED: ICD-10-CM

## 2023-05-25 DIAGNOSIS — R60.0 BILATERAL LEG EDEMA: ICD-10-CM

## 2023-05-25 RX ORDER — LEVOFLOXACIN 500 MG/1
500 TABLET, FILM COATED ORAL DAILY
Qty: 10 TABLET | Refills: 1 | Status: SHIPPED | OUTPATIENT
Start: 2023-05-25

## 2023-05-25 NOTE — TELEPHONE ENCOUNTER
----- Message from Rosenda Washburn sent at 5/25/2023  2:07 PM CDT -----  Type:  Needs Medical Advice    Who Called:  pt  Symptoms (please be specific):    How long has patient had these symptoms:    Pharmacy name and phone #:    Would the patient rather a call back or a response via [facility]? Call   Best Call Back Number: [phone]  Additional Information: pt wants to speak to the office

## 2023-05-26 ENCOUNTER — TELEPHONE (OUTPATIENT)
Dept: NEPHROLOGY | Facility: CLINIC | Age: 78
End: 2023-05-26
Payer: MEDICARE

## 2023-05-26 NOTE — TELEPHONE ENCOUNTER
"----- Message from Lang Dyson sent at 5/26/2023  2:47 PM CDT -----  Pt Requesting Call Back    Who called: pt  Who called for pt:  Best call back #:   Add notes: pt requests call back from [facility]; pt says the rx she picked up gives her problems so she wants a "simple one like Amoxicillin"    "

## 2023-05-31 ENCOUNTER — TELEPHONE (OUTPATIENT)
Dept: NEPHROLOGY | Facility: CLINIC | Age: 78
End: 2023-05-31
Payer: MEDICARE

## 2023-05-31 NOTE — TELEPHONE ENCOUNTER
----- Message from Bethany Quinones sent at 5/31/2023 12:24 PM CDT -----  Type:  Needs Medical Advice    Who Called: pt  Symptoms (please be specific): UTI   Would the patient rather a call back or a response via [facility]? call  Best Call Back Number: [phone]  Additional Information: would like a call from nurse in office regarding a new medication being called in please call

## 2023-06-01 ENCOUNTER — TELEPHONE (OUTPATIENT)
Dept: NEPHROLOGY | Facility: CLINIC | Age: 78
End: 2023-06-01
Payer: MEDICARE

## 2023-06-01 DIAGNOSIS — N30.00 ACUTE CYSTITIS WITHOUT HEMATURIA: Primary | ICD-10-CM

## 2023-06-01 RX ORDER — DOXYCYCLINE HYCLATE 100 MG
100 TABLET ORAL 2 TIMES DAILY
Qty: 20 TABLET | Refills: 0 | Status: SHIPPED | OUTPATIENT
Start: 2023-06-01 | End: 2023-06-11

## 2023-06-01 NOTE — TELEPHONE ENCOUNTER
----- Message from Reggie Thrasher sent at 6/1/2023 10:42 AM CDT -----  Contact: Pt.  .Type:  Needs Medical Advice    Who Called: pt    Would the patient rather a call back or a response via [facility]?  Call back  Best Call Back Number: [phone]  Additional Information: Pt. Is requesting a call back from [facility].

## 2023-06-01 NOTE — TELEPHONE ENCOUNTER
----- Message from Lang Dyson sent at 6/1/2023  9:17 AM CDT -----  Type:  Patient Returning Call    Who Called: pt  Who Left Message for Patient: pt said staff [facility]  Does the patient know what this is regarding?:  Would the patient rather a call back or a response via [facility]? Call   Best Call Back Number: [phone]  Additional Information:

## 2023-06-08 ENCOUNTER — OFFICE VISIT (OUTPATIENT)
Dept: NEPHROLOGY | Facility: CLINIC | Age: 78
End: 2023-06-08
Payer: MEDICARE

## 2023-06-08 VITALS
DIASTOLIC BLOOD PRESSURE: 60 MMHG | BODY MASS INDEX: 44.19 KG/M2 | HEART RATE: 67 BPM | HEIGHT: 68 IN | WEIGHT: 291.56 LBS | SYSTOLIC BLOOD PRESSURE: 149 MMHG

## 2023-06-08 DIAGNOSIS — N18.31 STAGE 3A CHRONIC KIDNEY DISEASE: Primary | ICD-10-CM

## 2023-06-08 DIAGNOSIS — N30.00 ACUTE CYSTITIS WITHOUT HEMATURIA: ICD-10-CM

## 2023-06-08 DIAGNOSIS — I10 HTN (HYPERTENSION), BENIGN: ICD-10-CM

## 2023-06-08 PROCEDURE — 1125F AMNT PAIN NOTED PAIN PRSNT: CPT | Mod: CPTII,S$GLB,, | Performed by: INTERNAL MEDICINE

## 2023-06-08 PROCEDURE — 99999 PR PBB SHADOW E&M-EST. PATIENT-LVL III: CPT | Mod: PBBFAC,,, | Performed by: INTERNAL MEDICINE

## 2023-06-08 PROCEDURE — 99214 PR OFFICE/OUTPT VISIT, EST, LEVL IV, 30-39 MIN: ICD-10-PCS | Mod: S$GLB,,, | Performed by: INTERNAL MEDICINE

## 2023-06-08 PROCEDURE — 3078F DIAST BP <80 MM HG: CPT | Mod: CPTII,S$GLB,, | Performed by: INTERNAL MEDICINE

## 2023-06-08 PROCEDURE — 99999 PR PBB SHADOW E&M-EST. PATIENT-LVL III: ICD-10-PCS | Mod: PBBFAC,,, | Performed by: INTERNAL MEDICINE

## 2023-06-08 PROCEDURE — 3288F PR FALLS RISK ASSESSMENT DOCUMENTED: ICD-10-PCS | Mod: CPTII,S$GLB,, | Performed by: INTERNAL MEDICINE

## 2023-06-08 PROCEDURE — 1159F PR MEDICATION LIST DOCUMENTED IN MEDICAL RECORD: ICD-10-PCS | Mod: CPTII,S$GLB,, | Performed by: INTERNAL MEDICINE

## 2023-06-08 PROCEDURE — 3077F SYST BP >= 140 MM HG: CPT | Mod: CPTII,S$GLB,, | Performed by: INTERNAL MEDICINE

## 2023-06-08 PROCEDURE — 99214 OFFICE O/P EST MOD 30 MIN: CPT | Mod: S$GLB,,, | Performed by: INTERNAL MEDICINE

## 2023-06-08 PROCEDURE — 3077F PR MOST RECENT SYSTOLIC BLOOD PRESSURE >= 140 MM HG: ICD-10-PCS | Mod: CPTII,S$GLB,, | Performed by: INTERNAL MEDICINE

## 2023-06-08 PROCEDURE — 1125F PR PAIN SEVERITY QUANTIFIED, PAIN PRESENT: ICD-10-PCS | Mod: CPTII,S$GLB,, | Performed by: INTERNAL MEDICINE

## 2023-06-08 PROCEDURE — 1101F PT FALLS ASSESS-DOCD LE1/YR: CPT | Mod: CPTII,S$GLB,, | Performed by: INTERNAL MEDICINE

## 2023-06-08 PROCEDURE — 3288F FALL RISK ASSESSMENT DOCD: CPT | Mod: CPTII,S$GLB,, | Performed by: INTERNAL MEDICINE

## 2023-06-08 PROCEDURE — 1101F PR PT FALLS ASSESS DOC 0-1 FALLS W/OUT INJ PAST YR: ICD-10-PCS | Mod: CPTII,S$GLB,, | Performed by: INTERNAL MEDICINE

## 2023-06-08 PROCEDURE — 1159F MED LIST DOCD IN RCRD: CPT | Mod: CPTII,S$GLB,, | Performed by: INTERNAL MEDICINE

## 2023-06-08 PROCEDURE — 3078F PR MOST RECENT DIASTOLIC BLOOD PRESSURE < 80 MM HG: ICD-10-PCS | Mod: CPTII,S$GLB,, | Performed by: INTERNAL MEDICINE

## 2023-06-08 RX ORDER — FUROSEMIDE 40 MG/1
40 TABLET ORAL 2 TIMES DAILY
Qty: 60 TABLET | Refills: 11 | Status: SHIPPED | OUTPATIENT
Start: 2023-06-08 | End: 2024-06-07

## 2023-06-08 RX ORDER — AMLODIPINE BESYLATE 10 MG/1
10 TABLET ORAL DAILY
Qty: 90 TABLET | Refills: 3 | Status: SHIPPED | OUTPATIENT
Start: 2023-06-08

## 2023-06-08 RX ORDER — AMOXICILLIN AND CLAVULANATE POTASSIUM 500; 125 MG/1; MG/1
1 TABLET, FILM COATED ORAL 3 TIMES DAILY
Qty: 21 TABLET | Refills: 1 | Status: SHIPPED | OUTPATIENT
Start: 2023-06-08 | End: 2023-06-15

## 2023-06-08 RX ORDER — ALLOPURINOL 100 MG/1
100 TABLET ORAL DAILY
Qty: 90 TABLET | Refills: 3 | Status: SHIPPED | OUTPATIENT
Start: 2023-06-08

## 2023-06-08 NOTE — PROGRESS NOTES
Name: Lexii Gonzalez  Medical Record Number: [id number]  Date of Service: 2023  Note By: Red Altamirano DO    [facility]    History of Present Illness:  Reason for Consultation: CKD 3  Referring Provider: Dr. Castaneda     History of Present Illness:  Lexii Gonzalez is a 77 y.o. female with past medical history of CKD 3a who presents for Follow up.  She denies any CP, SOB, N/V. Pt sts she was recently tx for UTI with 7 days of Doxycline. UA shows E. Coli infection that is sensitive to Augmentin and Cephalosporins.  Having issues with sinus.  Denies COVID-19. No LOT, No LOS, or COREY. No foam, no blood in urine but sts she has discomfort and fullness over bladder with cramping. Sts urine has no smell. Pt sts she had COVID vaccine Pfizer on 2021 and in 2021. No dysuria at this time and no discharge. Stays at home mostly.     History of CKD3a  Nephrologist Dr. Altamirano  Access n/a?  Dialysis center n/a?    Past Medical History:  Past Medical History:   Diagnosis Date    Disorder of kidney and ureter 2016    High cholesterol     Hypertension     Osteoarthritis        Past Surgical History:  Past Surgical History:   Procedure Laterality Date    APPENDECTOMY  1975     SECTION      x 3-, ,     CHOLECYSTECTOMY  1974    ELBOW SURGERY Right 2001    removal of lipoma    ENDOMETRIAL ABLATION  2002    INCISIONAL HERNIA REPAIR  1996    TUBAL LIGATION  1978       Family History:  No family history on file.    Social History:  Social History     Socioeconomic History    Marital status:    Tobacco Use    Smoking status: Never    Smokeless tobacco: Never   Substance and Sexual Activity    Alcohol use: Yes     Comment: maybe once a yr if that    Drug use: No       Home Medications:   (Not in a hospital admission)      Allergies:  Ditropan [oxybutynin chloride], Levaquin [levofloxacin], Lisinopril, and Lotensin [benazepril]    Review of Systems:  10 point review of  systems was conducted and was negative except as mentioned in the HPI.    Physical Exam:  Vitals:  There were no vitals filed for this visit.  [unfilled]      Exam  General: No acute distress, well groomed, alert and oriented x 3  HEENT: Normocephalic, atraumatic, EOM's intact bilaterally, external inspection of ears and nose normal, moist mucous membranes, no oral ulcerations/lesions  Neck: Supple, symmetrical, trachea midline, no thyromegaly, no JVD  Respiratory: Clear to auscultation bilaterally, respirations unlabored, no rales/rhonchi/wheezing  Cardiovacular: Regular rate and rhythm, S1, S2 normal, no murmurs, rubs or gallops  Gastrointestinal: Soft, non-tender, bowel sounds normal, no hepatosplenomegaly  Musculoskeletal: No knee or ankle joint tenderness or swelling.   Extremities: No clubbing or cyanosis of bilateral upper extremities; 2 + lower extremity edema bilaterally, radial pulses 2+ bilaterally, symmetric  Skin: warm and dry; no rash on exposed skin  Neurologic: CN grossly intact. No asterixis.    Labs:  A1C:      CBC:  Recent Labs   Lab 07/08/21  1035 03/08/22  0920 07/18/22  1136   WBC 4.9 4.8 5.0   RBC 4.20 4.16 L 4.01 L   Hemoglobin 12.4 12.3 11.9 L   Hematocrit 37.6 37.8 36.8 L   Platelets 172 171 151   MCV 89.5 90.9 91.8   MCH 29.6 29.6 29.6   MCHC 33.1 32.5 32.2     CMP:  Recent Labs   Lab 11/29/22  1028   Glucose 90   Calcium 9.7   ALBUMIN 4.3   Total Protein 6.4   Sodium 140   Potassium 3.9   CO2 29   Chloride 101   BUN 30.0 H   Creatinine 1.34 H   Alkaline Phosphatase 49   ALT 17   AST 27   Total Bilirubin 0.6     LIPIDS:      TSH:      URINALYSIS:  No results for input(s): COLORU, CLARITYU, SPECGRAV, PHUR, PROTEINUA, GLUCOSEU, BILIRUBINCON, BLOODU, WBCU, RBCU, BACTERIA, MUCUS, NITRITE, LEUKOCYTESUR, UROBILINOGEN, HYALINECASTS in the last 2160 hours.     Lab Results   Component Value Date    WBC 5.0 07/18/2022    HGB 11.9 (L) 07/18/2022    HCT 36.8 (L) 07/18/2022     11/29/2022    K  3.9 11/29/2022     11/29/2022    CO2 29 11/29/2022    BUN 30.0 (H) 11/29/2022    CREATININE 1.34 (H) 11/29/2022    CALCIUM 9.7 11/29/2022    PHOS 2.8 11/29/2022    MG 2.1 11/29/2022    ALBUMIN 4.3 11/29/2022    AST 27 11/29/2022    ALT 17 11/29/2022       No results found for: EXTANC, EXTWBC, EXTSEGS, EXTPLATELETS, EXTHEMOGLOBI, EXTHEMATOCRI, EXTCREATININ, EXTSODIUM, EXTPOTASSIUM, EXTBUN, EXTCO2, EXTCALCIUM, EXTPHOSPHORU, EXTGLUCOSE, EXTALBUMIN, EXTAST, EXTALT, EXTBILITOTAL, EXTLIPASE, EXTAMYLASE    No results found for: EXTCYCLOSLVL, EXTSIROLIMUS, EXTTACROLVL, EXTPROTCRE, EXTPTHINTACT, EXTPROTEINUA, EXTWBCUA, EXTRBCUA    Imaging Studies: n/a  ?    Assessment/Plan:  77 y.o. female with:     1- CKD 3b - Renal function is worse with GFR of 54 --> 41, BUN/Cr 30/1 --> 30/1.34 --> 31/1.2, .  Non-oliguric.   Renal US without obstruction, mass, stones, hydro. (R) is 11.3 cm, (L) is 11.7.  Drink more water --> Na is finally 140!.     2. Edema - Pt is currently on HCTZ 25 mg.  Will D/C HCTZ and start Lasix 40 mg bid, Spironolactone 25 mg.      3. Hypertension - Not well controlled with current meds.  Amlodipine 10 mg at HS, Doxazocin 8 mg, Reduce Metoprolol succinate 50 mg, sts it makes her lethargic.     4. Hyperuricemia - Allopurinol, ASx. UA level is 7.4 mg     5. UTI - Currently on Doxycycline.  Consider starting Augmentin.        RTC in 4 months.           Red Altamirano, DO  [facility]